# Patient Record
Sex: FEMALE | Race: WHITE | Employment: PART TIME | URBAN - METROPOLITAN AREA
[De-identification: names, ages, dates, MRNs, and addresses within clinical notes are randomized per-mention and may not be internally consistent; named-entity substitution may affect disease eponyms.]

---
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
## 2019-06-04 ENCOUNTER — OFFICE VISIT (OUTPATIENT)
Dept: FAMILY MEDICINE CLINIC | Age: 19
End: 2019-06-04
Payer: MEDICAID

## 2019-06-04 ENCOUNTER — TELEPHONE (OUTPATIENT)
Dept: FAMILY MEDICINE CLINIC | Age: 19
End: 2019-06-04

## 2019-06-04 VITALS
OXYGEN SATURATION: 99 % | HEIGHT: 71 IN | DIASTOLIC BLOOD PRESSURE: 58 MMHG | HEART RATE: 62 BPM | BODY MASS INDEX: 21.14 KG/M2 | RESPIRATION RATE: 18 BRPM | SYSTOLIC BLOOD PRESSURE: 102 MMHG | WEIGHT: 151 LBS | TEMPERATURE: 98.3 F

## 2019-06-04 DIAGNOSIS — S16.1XXA STRAIN OF NECK MUSCLE, INITIAL ENCOUNTER: Primary | ICD-10-CM

## 2019-06-04 PROCEDURE — 99213 OFFICE O/P EST LOW 20 MIN: CPT | Performed by: NURSE PRACTITIONER

## 2019-06-04 RX ORDER — IBUPROFEN 800 MG/1
800 TABLET ORAL EVERY 8 HOURS PRN
Qty: 20 TABLET | Refills: 0 | Status: SHIPPED | OUTPATIENT
Start: 2019-06-04 | End: 2019-11-27

## 2019-06-04 RX ORDER — ORPHENADRINE CITRATE 100 MG/1
100 TABLET, EXTENDED RELEASE ORAL 2 TIMES DAILY
Qty: 10 TABLET | Refills: 0 | Status: SHIPPED | OUTPATIENT
Start: 2019-06-04 | End: 2019-06-04

## 2019-06-04 RX ORDER — IBUPROFEN 800 MG/1
800 TABLET ORAL EVERY 8 HOURS PRN
Qty: 20 TABLET | Refills: 0 | Status: SHIPPED | OUTPATIENT
Start: 2019-06-04 | End: 2019-06-04

## 2019-06-04 RX ORDER — ORPHENADRINE CITRATE 100 MG/1
100 TABLET, EXTENDED RELEASE ORAL 2 TIMES DAILY
Qty: 10 TABLET | Refills: 0 | Status: SHIPPED | OUTPATIENT
Start: 2019-06-04 | End: 2019-06-09

## 2019-06-04 RX ORDER — NORETHINDRONE ACETATE AND ETHINYL ESTRADIOL 1MG-20(21)
1 KIT ORAL
COMMUNITY
Start: 2018-11-26 | End: 2019-12-25

## 2019-06-04 NOTE — LETTER
[address]  [place] [address]  Phone: [phone]  Fax: [phone] YADIAR David - CNP        June 4, 2019     Patient: Srinivas Mack   YOB: 2000   Date of Visit: 6/4/2019       To Whom It May Concern: It is my medical opinion that Srinivas Mack should be excused 6/4/19. If you have any questions or concerns, please don't hesitate to call.     Sincerely,          YADIRA Sylvester - CNP

## 2019-06-04 NOTE — PROGRESS NOTES
[address] [address]  Dept: [phone]  Dept Fax: [phone]    Rosa Isela Settler a 22 y.o. female who presents to the urgent care today for her medical conditions/complaintsas noted below. Katina Molina is c/o of Neck Pain (started yesterday and no relief from heat, ice or ibuprofen)      HPI:     Stretched yesterday and hurt rt side of neck  Hurts to move it up and to right  Taking motrin, out of it  Denies arm weakness, fever, rash, vomiting  Has tattoo behind left ear and not infected and not changed and not new      Neck Pain    This is a new problem. The current episode started yesterday. The problem occurs constantly. The problem has been waxing and waning. Associated with: was only stretching. The quality of the pain is described as aching. The pain is moderate. Exacerbated by: movement. Pertinent negatives include no fever or headaches. Associated symptoms comments: right neck pain. She has tried NSAIDs and heat for the symptoms. The treatment provided mild relief. History reviewed. No pertinent past medical history. Current Outpatient Medications   Medication Sig Dispense Refill    norethindrone-ethinyl estradiol (JUNEL FE 1/20) 1-20 MG-MCG per tablet Take 1 tablet by mouth      orphenadrine (NORFLEX) 100 MG extended release tablet Take 1 tablet by mouth 2 times daily for 5 days 10 tablet 0    ibuprofen (ADVIL;MOTRIN) 800 MG tablet Take 1 tablet by mouth every 8 hours as needed for Pain 20 tablet 0     No current facility-administered medications for this visit. No Known Allergies    Subjective:     Review of Systems   Constitutional: Negative for fever. Musculoskeletal: Positive for neck pain and neck stiffness. Skin: Negative for rash. Neurological: Negative for dizziness and headaches. All other systems reviewed and are negative.       Objective:      Physical Exam weakness, fever, headache, concern  rx norflex and motrin  Return for follow up with primary care in 2-3 days, worsening, change or concern. Orders Placed This Encounter   Medications    orphenadrine (NORFLEX) 100 MG extended release tablet     Sig: Take 1 tablet by mouth 2 times daily for 5 days     Dispense:  10 tablet     Refill:  0    ibuprofen (ADVIL;MOTRIN) 800 MG tablet     Sig: Take 1 tablet by mouth every 8 hours as needed for Pain     Dispense:  20 tablet     Refill:  0         Patient given educational materials - see patientinstructions. Discussed use, benefit, and side effects of prescribed medications. All patient questions answered. Pt voiced understanding.     Electronically signed by YADIRA Hudson 6/4/2019 at 10:59 AM

## 2019-06-04 NOTE — PATIENT INSTRUCTIONS
Motrin as directed  Tylenol as directed over the counter  Warm compresses off and on  Icy hot  Gentle stretching  Avoid alcohol, work, operating machinery or driving on muscle relaxer  Recheck with primary care this week, sooner if worse, change or concern  - arm weakness, fever, headache, concern  Patient Education        Neck Strain: Care Instructions  Your Care Instructions    You have strained the muscles and ligaments in your neck. A sudden, awkward movement can strain the neck. This often occurs with falls or car accidents or during certain sports. Everyday activities like working on a computer or sleeping can also cause neck strain if they force you to hold your neck in an awkward position for a long time. It is common for neck pain to get worse for a day or two after an injury, but it should start to feel better after that. You may have more pain and stiffness for several days before it gets better. This is expected. It may take a few weeks or longer for it to heal completely. Good home treatment can help you get better faster and avoid future neck problems. Follow-up care is a key part of your treatment and safety. Be sure to make and go to all appointments, and call your doctor if you are having problems. It's also a good idea to know your test results and keep a list of the medicines you take. How can you care for yourself at home? · If you were given a neck brace (cervical collar) to limit neck motion, wear it as instructed for as many days as your doctor tells you to. Do not wear it longer than you were told to. Wearing a brace for too long can make neck stiffness worse and weaken the neck muscles. · You can try using heat or ice to see if it helps. ? Try using a heating pad on a low or medium setting for 15 to 20 minutes every 2 to 3 hours. Try a warm shower in place of one session with the heating pad. You can also buy single-use heat wraps that last up to 8 hours.   ? You can also try an ice pack for 10 to 15 minutes every 2 to 3 hours. · Take pain medicines exactly as directed. ? If the doctor gave you a prescription medicine for pain, take it as prescribed. ? If you are not taking a prescription pain medicine, ask your doctor if you can take an over-the-counter medicine. · Gently rub the area to relieve pain and help with blood flow. Do not massage the area if it hurts to do so. · Do not do anything that makes the pain worse. Take it easy for a couple of days. You can do your usual activities if they do not hurt your neck or put it at risk for more stress or injury. · Try sleeping on a special neck pillow. Place it under your neck, not under your head. Placing a tightly rolled-up towel under your neck while you sleep will also work. If you use a neck pillow or rolled towel, do not use your regular pillow at the same time. · To prevent future neck pain, do exercises to stretch and strengthen your neck and back. Learn how to use good posture, safe lifting techniques, and proper body mechanics. When should you call for help? Call 911 anytime you think you may need emergency care. For example, call if:    · You are unable to move an arm or a leg at all.   [facility] your doctor now or seek immediate medical care if:    · You have new or worse symptoms in your arms, legs, chest, belly, or buttocks. Symptoms may include:  ? Numbness or tingling. ? Weakness. ? Pain.     · You lose bladder or bowel control.    Watch closely for changes in your health, and be sure to contact your doctor if:    · You are not getting better as expected. Where can you learn more? Go to https://bencheepeOpera SoftwareewStereotaxis.popchips. org and sign in to your Prezacor account. Enter M253 in the Pharmapod box to learn more about \"Neck Strain: Care Instructions. \"     If you do not have an account, please click on the \"Sign Up Now\" link.   Current as of: September 20, 2018  Content Version: 12.0  © 0488-1495 Healthwise, Incorporated. Care instructions adapted under license by [facility] [address] If you have questions about a medical condition or this instruction, always ask your healthcare professional. [place] [address] any warranty or liability for your use of this information. Patient Education        Neck Strain: Care Instructions  Your Care Instructions    You have strained the muscles and ligaments in your neck. A sudden, awkward movement can strain the neck. This often occurs with falls or car accidents or during certain sports. Everyday activities like working on a computer or sleeping can also cause neck strain if they force you to hold your neck in an awkward position for a long time. It is common for neck pain to get worse for a day or two after an injury, but it should start to feel better after that. You may have more pain and stiffness for several days before it gets better. This is expected. It may take a few weeks or longer for it to heal completely. Good home treatment can help you get better faster and avoid future neck problems. Follow-up care is a key part of your treatment and safety. Be sure to make and go to all appointments, and call your doctor if you are having problems. It's also a good idea to know your test results and keep a list of the medicines you take. How can you care for yourself at home? · If you were given a neck brace (cervical collar) to limit neck motion, wear it as instructed for as many days as your doctor tells you to. Do not wear it longer than you were told to. Wearing a brace for too long can make neck stiffness worse and weaken the neck muscles. · You can try using heat or ice to see if it helps. ? Try using a heating pad on a low or medium setting for 15 to 20 minutes every 2 to 3 hours. Try a warm shower in place of one session with the heating pad. You can also buy single-use heat wraps that last up to 8 hours.   ? You can also try an ice pack for 10 to 15 minutes every 2 to 3 hours. · Take pain medicines exactly as directed. ? If the doctor gave you a prescription medicine for pain, take it as prescribed. ? If you are not taking a prescription pain medicine, ask your doctor if you can take an over-the-counter medicine. · Gently rub the area to relieve pain and help with blood flow. Do not massage the area if it hurts to do so. · Do not do anything that makes the pain worse. Take it easy for a couple of days. You can do your usual activities if they do not hurt your neck or put it at risk for more stress or injury. · Try sleeping on a special neck pillow. Place it under your neck, not under your head. Placing a tightly rolled-up towel under your neck while you sleep will also work. If you use a neck pillow or rolled towel, do not use your regular pillow at the same time. · To prevent future neck pain, do exercises to stretch and strengthen your neck and back. Learn how to use good posture, safe lifting techniques, and proper body mechanics. When should you call for help? Call 911 anytime you think you may need emergency care. For example, call if:    · You are unable to move an arm or a leg at all.   [facility] your doctor now or seek immediate medical care if:    · You have new or worse symptoms in your arms, legs, chest, belly, or buttocks. Symptoms may include:  ? Numbness or tingling. ? Weakness. ? Pain.     · You lose bladder or bowel control.    Watch closely for changes in your health, and be sure to contact your doctor if:    · You are not getting better as expected. Where can you learn more? Go to https://SynthelischarleneMultiLing Corporation.Zedmo. org and sign in to your Techoz account. Enter M253 in the Zingdom Communications box to learn more about \"Neck Strain: Care Instructions. \"     If you do not have an account, please click on the \"Sign Up Now\" link. Current as of: September 20, 2018  Content Version: 12.0  © 7580-7204 Healthwise, [facility].  Care instructions adapted under license by [facility] ([facility]). If you have questions about a medical condition or this instruction, always ask your healthcare professional. [address] any warranty or liability for your use of this information. Patient Education        orphenadrine  Pronunciation:  carleen sweeney  Brand:  Norflex  What is the most important information I should know about orphenadrine? You should not take this medicine if you have urination problems, an enlarged prostate, glaucoma, a stomach ulcer or blockage in your digestive tract, trouble swallowing, or myasthenia gravis. What is orphenadrine? Orphenadrine is a muscle relaxer. Orphenadrine is used together with rest and physical therapy to treat skeletal muscle conditions such as pain or injury. Orphenadrine may also be used for purposes not listed in this medication guide. What should I discuss with my healthcare provider before taking orphenadrine? You should not use orphenadrine if you are allergic to it, or if you have:  · urination problems;  · an enlarged prostate;  · glaucoma;  · a stomach ulcer;  · a blockage in your stomach or intestines;  · trouble swallowing; or  · myasthenia gravis. To make sure orphenadrine is safe for you, tell your doctor if you have:  · heart disease;  · a heart rhythm disorder;  · coronary artery disease; or  · if you also use a narcotic (opioid) medication. It is not known whether this medicine will harm an unborn baby. Tell your doctor if you are pregnant or plan to become pregnant. It is not known whether orphenadrine passes into breast milk or if it could affect the nursing baby. Tell your doctor if you are breast-feeding. Orphenadrine is not approved for use by anyone younger than 25years old. How should I take orphenadrine? Follow all directions on your prescription label. Do not take this medicine in larger or smaller amounts or for longer than recommended.   Orphenadrine may be habit-forming. Never share orphenadrine with another person, especially someone with a history of drug abuse or addiction. Keep the medication in a place where others cannot get to it. Selling or giving away this medicine is against the law. Orphenadrine is usually taken 2 times per day, once in the morning and once in the evening. Follow your doctor's dosing instructions. Do not crush, chew, or break an extended-release tablet. Swallow it whole. Orphenadrine is only part of a complete program of treatment that may also include rest, physical therapy, or other pain relief measures. Store orphenadrine at room temperature away from moisture, heat, and light. Keep the bottle tightly closed when not in use. Keep track of the amount of medicine used from each new bottle. Orphenadrine is a drug of abuse and you should be aware if anyone is using your medicine improperly or without a prescription. What happens if I miss a dose? Take the missed dose as soon as you remember. Skip the missed dose if it is almost time for your next scheduled dose. Do not take extra medicine to make up the missed dose. What happens if I overdose? Seek emergency medical attention or call the Poison Help line at 1-442.590.8597. What should I avoid while taking orphenadrine? This medicine may impair your thinking or reactions. Be careful if you drive or do anything that requires you to be alert. Do not drink alcohol. Dangerous side effects or death can occur. What are the possible side effects of orphenadrine? Get emergency medical help if you have signs of an allergic reaction: hives; difficult breathing; swelling of your face, lips, tongue, or throat.   Stop using orphenadrine and call your doctor at once if you have:  · a light-headed feeling, like you might pass out;  · painful or difficult urination;  · little or no urination;  · confusion, anxiety, agitation, tremors, hallucinations; or  · pounding heartbeats or fluttering in your chest.  Common side effects may include:  · dizziness, drowsiness, weakness;  · nausea, vomiting;  · dry mouth; or  · constipation. This is not a complete list of side effects and others may occur. Tell your doctor about any unusual or bothersome side effect. You may report side effects to FDA at 6-841-FDA-8432. What other drugs will affect orphenadrine? Taking orphenadrine with other drugs that make you sleepy or slow your breathing can cause dangerous side effects or death. Ask your doctor before taking a sleeping pill, narcotic pain medicine, prescription cough medicine, a muscle relaxer, or medicine for anxiety, depression, or seizures. Other drugs may interact with orphenadrine, including prescription and over-the-counter medicines, vitamins, and herbal products. Tell each of your health care providers about all medicines you use now and any medicine you start or stop using. Where can I get more information? Your pharmacist can provide more information about orphenadrine. Remember, keep this and all other medicines out of the reach of children, never share your medicines with others, and use this medication only for the indication prescribed. Every effort has been made to ensure that the information provided by [address] is accurate, up-to-date, and complete, but no guarantee is made to that effect. Drug information contained herein may be time sensitive. [employer] information has been compiled for use by healthcare practitioners and consumers in the [place] and therefore [employer] does not warrant that uses outside of the [place] are appropriate, unless specifically indicated otherwise. [employer] drug information does not endorse drugs, diagnose patients or recommend therapy.  [employer] drug information is an informational resource designed to assist licensed healthcare practitioners in caring for their patients and/or to serve consumers viewing this service

## 2019-11-27 ENCOUNTER — HOSPITAL ENCOUNTER (OUTPATIENT)
Age: 19
Setting detail: SPECIMEN
Discharge: HOME | End: 2019-11-27
Payer: COMMERCIAL

## 2019-11-27 ENCOUNTER — OFFICE VISIT (OUTPATIENT)
Dept: OBGYN CLINIC | Age: 19
End: 2019-11-27
Payer: COMMERCIAL

## 2019-11-27 VITALS
SYSTOLIC BLOOD PRESSURE: 111 MMHG | DIASTOLIC BLOOD PRESSURE: 60 MMHG | HEART RATE: 83 BPM | WEIGHT: 145 LBS | HEIGHT: 61 IN | BODY MASS INDEX: 27.38 KG/M2

## 2019-11-27 DIAGNOSIS — N92.6 IRREGULAR MENSES: ICD-10-CM

## 2019-11-27 DIAGNOSIS — Z01.419 WOMEN'S ANNUAL ROUTINE GYNECOLOGICAL EXAMINATION: Primary | ICD-10-CM

## 2019-11-27 DIAGNOSIS — Z80.49 FAMILY HISTORY OF UTERINE CANCER: ICD-10-CM

## 2019-11-27 DIAGNOSIS — Z30.41 ENCOUNTER FOR SURVEILLANCE OF CONTRACEPTIVE PILLS: ICD-10-CM

## 2019-11-27 DIAGNOSIS — Z20.2 POSSIBLE EXPOSURE TO STD: ICD-10-CM

## 2019-11-27 DIAGNOSIS — Z12.4 CERVICAL CANCER SCREENING: ICD-10-CM

## 2019-11-27 DIAGNOSIS — Z30.09 GENERAL COUNSELLING AND ADVICE ON CONTRACEPTION: ICD-10-CM

## 2019-11-27 PROCEDURE — 99385 PREV VISIT NEW AGE 18-39: CPT | Performed by: ADVANCED PRACTICE MIDWIFE

## 2019-11-27 ASSESSMENT — PATIENT HEALTH QUESTIONNAIRE - PHQ9
SUM OF ALL RESPONSES TO PHQ9 QUESTIONS 1 & 2: 0
1. LITTLE INTEREST OR PLEASURE IN DOING THINGS: 0
2. FEELING DOWN, DEPRESSED OR HOPELESS: 0
SUM OF ALL RESPONSES TO PHQ QUESTIONS 1-9: 0
SUM OF ALL RESPONSES TO PHQ QUESTIONS 1-9: 0

## 2019-11-27 ASSESSMENT — ENCOUNTER SYMPTOMS
EYES NEGATIVE: 1
RESPIRATORY NEGATIVE: 1
ALLERGIC/IMMUNOLOGIC NEGATIVE: 1
GASTROINTESTINAL NEGATIVE: 1

## 2019-11-29 DIAGNOSIS — B37.31 VAGINAL YEAST INFECTION: Primary | ICD-10-CM

## 2019-11-29 LAB
CHLAMYDIA BY THIN PREP: NEGATIVE
DIRECT EXAM: ABNORMAL
Lab: ABNORMAL
N. GONORRHOEAE DNA, THIN PREP: NEGATIVE
SPECIMEN DESCRIPTION: ABNORMAL
SPECIMEN DESCRIPTION: NORMAL

## 2019-11-29 RX ORDER — FLUCONAZOLE 150 MG/1
150 TABLET ORAL ONCE
Qty: 1 TABLET | Refills: 0 | Status: SHIPPED | OUTPATIENT
Start: 2019-11-29 | End: 2019-11-29

## 2019-12-03 ENCOUNTER — TELEPHONE (OUTPATIENT)
Dept: OBGYN CLINIC | Age: 19
End: 2019-12-03

## 2019-12-03 DIAGNOSIS — B37.31 VAGINAL YEAST INFECTION: Primary | ICD-10-CM

## 2019-12-03 LAB — CYTOLOGY REPORT: NORMAL

## 2019-12-03 RX ORDER — FLUCONAZOLE 150 MG/1
150 TABLET ORAL ONCE
Qty: 1 TABLET | Refills: 0 | Status: SHIPPED | OUTPATIENT
Start: 2019-12-03 | End: 2019-12-03

## 2019-12-06 ENCOUNTER — TELEPHONE (OUTPATIENT)
Dept: OBGYN CLINIC | Age: 19
End: 2019-12-06

## 2019-12-24 ENCOUNTER — PROCEDURE VISIT (OUTPATIENT)
Dept: OBGYN CLINIC | Age: 19
End: 2019-12-24
Payer: COMMERCIAL

## 2019-12-24 DIAGNOSIS — N92.6 IRREGULAR MENSES: ICD-10-CM

## 2019-12-24 DIAGNOSIS — Z30.430 ENCOUNTER FOR IUD INSERTION: Primary | ICD-10-CM

## 2019-12-24 PROBLEM — Z97.5 IUD (INTRAUTERINE DEVICE) IN PLACE: Status: ACTIVE | Noted: 2019-12-24

## 2019-12-24 PROCEDURE — 58300 INSERT INTRAUTERINE DEVICE: CPT | Performed by: ADVANCED PRACTICE MIDWIFE

## 2019-12-25 VITALS
HEIGHT: 71 IN | WEIGHT: 150 LBS | SYSTOLIC BLOOD PRESSURE: 110 MMHG | HEART RATE: 67 BPM | DIASTOLIC BLOOD PRESSURE: 68 MMHG | BODY MASS INDEX: 21 KG/M2

## 2020-01-22 ENCOUNTER — HOSPITAL ENCOUNTER (OUTPATIENT)
Age: 20
Setting detail: SPECIMEN
Discharge: HOME | End: 2020-01-22
Payer: COMMERCIAL

## 2020-01-22 ENCOUNTER — OFFICE VISIT (OUTPATIENT)
Dept: FAMILY MEDICINE CLINIC | Age: 20
End: 2020-01-22
Payer: COMMERCIAL

## 2020-01-22 VITALS
TEMPERATURE: 99.1 F | WEIGHT: 145 LBS | RESPIRATION RATE: 16 BRPM | DIASTOLIC BLOOD PRESSURE: 66 MMHG | HEIGHT: 71 IN | HEART RATE: 72 BPM | OXYGEN SATURATION: 98 % | BODY MASS INDEX: 20.3 KG/M2 | SYSTOLIC BLOOD PRESSURE: 98 MMHG

## 2020-01-22 LAB — S PYO AG THROAT QL: NORMAL

## 2020-01-22 PROCEDURE — 99213 OFFICE O/P EST LOW 20 MIN: CPT | Performed by: NURSE PRACTITIONER

## 2020-01-22 PROCEDURE — 87880 STREP A ASSAY W/OPTIC: CPT | Performed by: NURSE PRACTITIONER

## 2020-01-22 RX ORDER — PREDNISONE 50 MG/1
50 TABLET ORAL DAILY
Qty: 2 TABLET | Refills: 0 | Status: SHIPPED | OUTPATIENT
Start: 2020-01-22 | End: 2020-01-24

## 2020-01-22 ASSESSMENT — PATIENT HEALTH QUESTIONNAIRE - PHQ9
SUM OF ALL RESPONSES TO PHQ QUESTIONS 1-9: 0
2. FEELING DOWN, DEPRESSED OR HOPELESS: 0
SUM OF ALL RESPONSES TO PHQ QUESTIONS 1-9: 0
1. LITTLE INTEREST OR PLEASURE IN DOING THINGS: 0
SUM OF ALL RESPONSES TO PHQ9 QUESTIONS 1 & 2: 0

## 2020-01-22 ASSESSMENT — ENCOUNTER SYMPTOMS
DIARRHEA: 0
RHINORRHEA: 0
VOMITING: 0
COUGH: 1
SORE THROAT: 1

## 2020-01-22 NOTE — PATIENT INSTRUCTIONS
Increase fluids  Good handwashing  Motrin and tylenol as needed as directed if able to take  Gargle warm salt water  May try flonase or claritin  We will notify you if strep culture is +tomorrow  Recheck for worsening, change or concern  Follow up with primary care in one week, sooner if worse  Patient Education        Sore Throat: Care Instructions  Your Care Instructions    Infection by bacteria or a virus causes most sore throats. Cigarette smoke, dry air, air pollution, allergies, and yelling can also cause a sore throat. Sore throats can be painful and annoying. Fortunately, most sore throats go away on their own. If you have a bacterial infection, your doctor may prescribe antibiotics. Follow-up care is a key part of your treatment and safety. Be sure to make and go to all appointments, and call your doctor if you are having problems. It's also a good idea to know your test results and keep a list of the medicines you take. How can you care for yourself at home? · If your doctor prescribed antibiotics, take them as directed. Do not stop taking them just because you feel better. You need to take the full course of antibiotics. · Gargle with warm salt water once an hour to help reduce swelling and relieve discomfort. Use 1 teaspoon of salt mixed in 1 cup of warm water. · Take an over-the-counter pain medicine, such as acetaminophen (Tylenol), ibuprofen (Advil, Motrin), or naproxen (Aleve). Read and follow all instructions on the label. · Be careful when taking over-the-counter cold or flu medicines and Tylenol at the same time. Many of these medicines have acetaminophen, which is Tylenol. Read the labels to make sure that you are not taking more than the recommended dose. Too much acetaminophen (Tylenol) can be harmful. · Drink plenty of fluids. Fluids may help soothe an irritated throat. Hot fluids, such as tea or soup, may help decrease throat pain.   · Use over-the-counter throat lozenges to soothe pain. Regular cough drops or hard candy may also help. These should not be given to young children because of the risk of choking. · Do not smoke or allow others to smoke around you. If you need help quitting, talk to your doctor about stop-smoking programs and medicines. These can increase your chances of quitting for good. · Use a vaporizer or humidifier to add moisture to your bedroom. Follow the directions for cleaning the machine. When should you call for help? Call your doctor now or seek immediate medical care if:    · You have new or worse trouble swallowing.     · Your sore throat gets much worse on one side.    Watch closely for changes in your health, and be sure to contact your doctor if you do not get better as expected. Where can you learn more? Go to https://SupplyBetter.ChipIn. org and sign in to your Samba TV account. Enter U599 in the Iptune box to learn more about \"Sore Throat: Care Instructions. \"     If you do not have an account, please click on the \"Sign Up Now\" link. Current as of: July 28, 2019  Content Version: 12.3  © 4566-7711 Healthwise, Incorporated. Care instructions adapted under license by [facility] ([facility]). If you have questions about a medical condition or this instruction, always ask your healthcare professional. [address] any warranty or liability for your use of this information.

## 2020-01-22 NOTE — PROGRESS NOTES
[address] [facility]  [address] [place] [address] [address] [address]  Dept: [phone]  Dept Fax: [phone]    Martina Harley a 23 y.o. female who presents to the urgent care today for her medical conditions/complaintsas noted below. Ivana Andres is c/o of Pharyngitis (feels like needles when swollowing x 4 days ago and has been getting worse since)      HPI:     Sore throat for 4 days  Denies runny nose, fever, vomiting, diarrhea, rash  Has minimal cough    Pharyngitis   This is a new problem. Episode onset: 4 days. The problem occurs intermittently. The problem has been waxing and waning. Associated symptoms include coughing and a sore throat. Pertinent negatives include no fever, rash or vomiting. The symptoms are aggravated by swallowing. Treatments tried: throat spray. Past Medical History:   Diagnosis Date    Irregular menses        Current Outpatient Medications   Medication Sig Dispense Refill    predniSONE (DELTASONE) 50 MG tablet Take 1 tablet by mouth daily for 2 doses 2 tablet 0     Current Facility-Administered Medications   Medication Dose Route Frequency Provider Last Rate Last Dose    levonorgestrel (MIRENA) IUD 52 mg 1 each  1 each Intrauterine Continuous YADIRA Leyva CNM   1 each at 12/24/19 1240      No Known Allergies    Subjective:     Review of Systems   Constitutional: Negative for fever. HENT: Positive for sore throat. Negative for rhinorrhea. Respiratory: Positive for cough. Gastrointestinal: Negative for diarrhea and vomiting. Skin: Negative for rash. All other systems reviewed and are negative. Objective:      Physical Exam  Vitals signs and nursing note reviewed. Constitutional:       General: She is not in acute distress. Appearance: Normal appearance. She is well-developed. She is not ill-appearing, toxic-appearing or diaphoretic. HENT:      Head: Normocephalic and atraumatic.       Right Ear: Tympanic membrane normal.      Left Ear: Tympanic membrane normal.      Nose: Nose normal.      Mouth/Throat:      Mouth: Mucous membranes are moist.      Pharynx: Posterior oropharyngeal erythema present. No oropharyngeal exudate. Comments: Tonsils are surgically absent  Eyes:      General: No scleral icterus. Right eye: No discharge. Left eye: No discharge. Pupils: Pupils are equal, round, and reactive to light. Neck:      Musculoskeletal: Normal range of motion and neck supple. No neck rigidity. Cardiovascular:      Rate and Rhythm: Normal rate and regular rhythm. Heart sounds: Normal heart sounds. No murmur. Pulmonary:      Effort: Pulmonary effort is normal. No respiratory distress. Breath sounds: Normal breath sounds. No stridor. No wheezing, rhonchi or rales. Abdominal:      General: Bowel sounds are normal.      Palpations: Abdomen is soft. Tenderness: There is no tenderness. Musculoskeletal: Normal range of motion. Right lower leg: No edema. Left lower leg: No edema. Lymphadenopathy:      Cervical: Cervical adenopathy present. Skin:     General: Skin is warm and dry. Coloration: Skin is not jaundiced or pale. Findings: No erythema or rash. Neurological:      General: No focal deficit present. Mental Status: She is alert and oriented to person, place, and time. Cranial Nerves: No cranial nerve deficit. Psychiatric:         Mood and Affect: Mood normal.         Behavior: Behavior normal.       BP 98/66   Pulse 72   Temp 99.1 °F (37.3 °C)   Resp 16   Ht 5' 11\" (1.803 m)   Wt 145 lb (65.8 kg)   LMP 12/21/2019   SpO2 98%   BMI 20.22 kg/m²   Results for POC orders placed in visit on 01/22/20   POCT rapid strep A   Result Value Ref Range    Strep A Ag None Detected None Detected       Assessment:          Diagnosis Orders   1. Acute pharyngitis, unspecified etiology  Strep A DNA probe, amplification   2.  Sore

## 2020-01-23 ENCOUNTER — OFFICE VISIT (OUTPATIENT)
Dept: OBGYN CLINIC | Age: 20
End: 2020-01-23

## 2020-01-23 LAB
DIRECT EXAM: NORMAL
Lab: NORMAL
SPECIMEN DESCRIPTION: NORMAL

## 2020-01-23 PROCEDURE — 99024 POSTOP FOLLOW-UP VISIT: CPT | Performed by: ADVANCED PRACTICE MIDWIFE

## 2020-01-23 NOTE — PROGRESS NOTES
Subjective:  Chief Complaint   Patient presents with    Follow-up     IUD String check      HPI:  Nawaf Douglass is a 23 y.o. female who arrives to office as an established patient for IUD string check. She reports she has had some irregular spotting with the IUD in place but otherwise no symptoms. No abdominal pain or cramping. Denies concerns. Declines STI screening today, denies new partners. Review of Systems   Constitutional: Negative. HENT: Negative. Eyes: Negative. Respiratory: Negative. Cardiovascular: Negative. Gastrointestinal: Negative. Endocrine: Negative. Genitourinary: Positive for vaginal bleeding (irregular since IUD placement). Negative for dyspareunia, flank pain, frequency, genital sores, menstrual problem, pelvic pain and vaginal pain. Musculoskeletal: Negative. Skin: Negative. Allergic/Immunologic: Negative. Neurological: Negative. Hematological: Negative. Psychiatric/Behavioral: Negative. Objective:  Vitals:    01/23/20 0802   BP: 114/73   Site: Left Upper Arm   Position: Sitting   Cuff Size: Medium Adult   Pulse: 100   Weight: 145 lb 6 oz (65.9 kg)   Height: 5' 11\" (1.803 m)      Body mass index is 20.28 kg/m². Patient's last menstrual period was 12/21/2019. No current outpatient medications on file prior to visit. Current Facility-Administered Medications on File Prior to Visit   Medication Dose Route Frequency Provider Last Rate Last Dose    levonorgestrel (MIRENA) IUD 52 mg 1 each  1 each Intrauterine Continuous Lindy Cassette, APRN - CNM   1 each at 12/24/19 1240      Past Medical History:   Diagnosis Date    Irregular menses        Last PAP was normal; November/2019. Physical Exam  Vitals signs reviewed. Constitutional:       Appearance: Normal appearance. She is normal weight. HENT:      Head: Normocephalic and atraumatic. Eyes:      Extraocular Movements: Extraocular movements intact.    Neck:      Musculoskeletal: Normal range of motion. Cardiovascular:      Rate and Rhythm: Normal rate and regular rhythm. Heart sounds: Normal heart sounds. No murmur. Pulmonary:      Effort: Pulmonary effort is normal. No respiratory distress. Breath sounds: Normal breath sounds. Abdominal:      General: Abdomen is flat. Bowel sounds are normal. There is no distension. Palpations: Abdomen is soft. Tenderness: There is no abdominal tenderness. Genitourinary:     General: Normal vulva. Exam position: Supine. Labia:         Right: No lesion. Left: No lesion. Vagina: No vaginal discharge. Cervix: Cervical bleeding (scant) present. No friability. Uterus: Normal.       Adnexa: Right adnexa normal and left adnexa normal.      Comments: IUD strings visualized from cervical os  Musculoskeletal: Normal range of motion. Lymphadenopathy:      Lower Body: No right inguinal adenopathy. No left inguinal adenopathy. Skin:     General: Skin is warm and dry. Capillary Refill: Capillary refill takes less than 2 seconds. Neurological:      Mental Status: She is alert and oriented to person, place, and time. Mental status is at baseline. Psychiatric:         Mood and Affect: Mood normal.         Behavior: Behavior normal.         Thought Content: Thought content normal.         Judgment: Judgment normal.        Assessment/Plan:    IUD check up  · Reviewed normalcy of spotting after IUD insertion and that it may persist even daily for 3-4 months. Spotting may persist longer but oftentimes decreases over time, some patients may eventually have no menses or spotting. · Reviewed condom usage for STI prevention  · GC/CT collected on 11/27/19  was negative  · Reviewed s/s of infection  · Reviewed s/s of expulsion including increased cramping, lengthening of IUD strings, or visualizing device.  Patient verbalized understanding  · Continue monthly string checks    Return in about 10 months (around 11/23/2020) for Annual exam.    Problem list reviewed and updated as indicated. Upon completion of the visit all questions were answered. History was reviewed as documented on Epic Navigator. Patient was seen with total face to face time of 15 minutes. More than 50% of this visit was spent face to face coordinating plan of care and answering questions regarding encounter diagnoses and all of the above. She was also counseled on her preventative health maintenance recommendations and follow-up.

## 2020-01-26 VITALS
SYSTOLIC BLOOD PRESSURE: 114 MMHG | WEIGHT: 145.38 LBS | DIASTOLIC BLOOD PRESSURE: 73 MMHG | HEART RATE: 100 BPM | HEIGHT: 71 IN | BODY MASS INDEX: 20.35 KG/M2

## 2020-01-26 ASSESSMENT — ENCOUNTER SYMPTOMS
RESPIRATORY NEGATIVE: 1
GASTROINTESTINAL NEGATIVE: 1
EYES NEGATIVE: 1
ALLERGIC/IMMUNOLOGIC NEGATIVE: 1

## 2020-02-04 ENCOUNTER — HOSPITAL ENCOUNTER (OUTPATIENT)
Age: 20
Setting detail: SPECIMEN
Discharge: HOME | End: 2020-02-04
Payer: COMMERCIAL

## 2020-02-04 ENCOUNTER — OFFICE VISIT (OUTPATIENT)
Dept: OBGYN CLINIC | Age: 20
End: 2020-02-04
Payer: COMMERCIAL

## 2020-02-04 VITALS
DIASTOLIC BLOOD PRESSURE: 71 MMHG | WEIGHT: 145 LBS | SYSTOLIC BLOOD PRESSURE: 108 MMHG | HEART RATE: 72 BPM | HEIGHT: 61 IN | BODY MASS INDEX: 27.38 KG/M2

## 2020-02-04 PROCEDURE — 99213 OFFICE O/P EST LOW 20 MIN: CPT | Performed by: ADVANCED PRACTICE MIDWIFE

## 2020-02-04 ASSESSMENT — ENCOUNTER SYMPTOMS
NAUSEA: 0
DIARRHEA: 0
VOMITING: 0
SHORTNESS OF BREATH: 0
ABDOMINAL PAIN: 0

## 2020-02-04 NOTE — PROGRESS NOTES
[place] [address] [address] [facility] [address]  Dept: [phone]    Patient Name: Elsa Amin  Patient Age: 23 y.o. Date of Visit: 2020    Subjective  Chief Complaint   Patient presents with    Vaginal Itching     start day about a week ago      Patient's last menstrual period was 2019. Arrives with concerns for possible yeast infection x1 week. Reports vaginal irritation/itching x1 week. Reports is sexually active with one male partner, declines STI testing. Reports having IUD placed in December        OB History        0    Para        Term                AB        Living           SAB        TAB        Ectopic        Molar        Multiple        Live Births                  Past Medical History:   Diagnosis Date    Irregular menses      No current outpatient medications on file. Current Facility-Administered Medications   Medication Dose Route Frequency Provider Last Rate Last Dose    levonorgestrel (MIRENA) IUD 52 mg 1 each  1 each Intrauterine Continuous Kailey Slice, APRN - CNM   1 each at 19 1240     Review of Systems   Constitutional: Negative for unexpected weight change. Respiratory: Negative for shortness of breath. Cardiovascular: Negative for chest pain, palpitations and leg swelling. Gastrointestinal: Negative for abdominal pain, diarrhea, nausea and vomiting. Genitourinary: Positive for vaginal discharge (and irritation). Negative for difficulty urinating, dyspareunia and vaginal pain. Neurological: Negative for dizziness, light-headedness and headaches. Objective  /71 (Site: Left Upper Arm, Position: Sitting, Cuff Size: Medium Adult)   Pulse 72   Ht 5' 1.32\" (1.558 m)   Wt 145 lb (65.8 kg)   LMP 2019 Comment: Pt has IUD insertion 19  BMI 27.11 kg/m²     Physical Exam  Vitals signs reviewed. Constitutional:       General: She is not in acute distress. Appearance: She is well-developed. She is not diaphoretic. Neck:      Musculoskeletal: Normal range of motion. Thyroid: No thyromegaly. Cardiovascular:      Rate and Rhythm: Normal rate and regular rhythm. Pulmonary:      Effort: Pulmonary effort is normal. No respiratory distress. Breath sounds: Normal breath sounds. Genitourinary:     Labia:         Right: No rash, tenderness, lesion or injury. Left: No rash, tenderness, lesion or injury. Vagina: Vaginal discharge (brown discharge) present. Cervix: No cervical motion tenderness or friability. Comments: IUD strings noted from cervical os  Musculoskeletal: Normal range of motion. Skin:     General: Skin is warm and dry. Neurological:      Mental Status: She is alert and oriented to person, place, and time. Psychiatric:         Behavior: Behavior normal.         Thought Content: Thought content normal.         Judgment: Judgment normal.           Assessment & Plan  1. Vaginal irritation  - Vaginitis DNA Probe; Future  - Reviewed and reinforced safe sex practices  - Offer STI testing, patient declined     2. IUD (intrauterine device) in place        Patient was seen with total face to face time of 15 minutes. Morethan 50% of this visit was on counseling and education regarding her diagnoses and her options. She was also counseled on her preventative health maintenance recommendations and follow-up. Return if symptoms worsen or fail to improve.     Electronically Signed Rebbecca Olszewski, APRN - CNM

## 2020-02-05 ENCOUNTER — TELEPHONE (OUTPATIENT)
Dept: OBGYN CLINIC | Age: 20
End: 2020-02-05

## 2020-02-05 LAB
DIRECT EXAM: ABNORMAL
Lab: ABNORMAL
SPECIMEN DESCRIPTION: ABNORMAL

## 2020-02-05 RX ORDER — METRONIDAZOLE 500 MG/1
500 TABLET ORAL 2 TIMES DAILY
Qty: 14 TABLET | Refills: 0 | Status: SHIPPED | OUTPATIENT
Start: 2020-02-05 | End: 2020-02-12

## 2020-02-05 RX ORDER — FLUCONAZOLE 150 MG/1
150 TABLET ORAL ONCE
Qty: 1 TABLET | Refills: 0 | Status: SHIPPED | OUTPATIENT
Start: 2020-02-05 | End: 2020-02-05

## 2020-02-05 NOTE — TELEPHONE ENCOUNTER
Pt informed of her result but pharmacy is not able to fill medication [place] [facility] [address] [facility]

## 2020-02-06 ENCOUNTER — TELEPHONE (OUTPATIENT)
Dept: OBGYN CLINIC | Age: 20
End: 2020-02-06

## 2020-04-22 ENCOUNTER — TELEPHONE (OUTPATIENT)
Dept: OBGYN CLINIC | Age: 20
End: 2020-04-22

## 2020-04-23 ENCOUNTER — TELEMEDICINE (OUTPATIENT)
Dept: OBGYN CLINIC | Age: 20
End: 2020-04-23
Payer: COMMERCIAL

## 2020-04-23 VITALS — BODY MASS INDEX: 21 KG/M2 | WEIGHT: 150 LBS | HEIGHT: 71 IN

## 2020-04-23 PROCEDURE — 99213 OFFICE O/P EST LOW 20 MIN: CPT | Performed by: ADVANCED PRACTICE MIDWIFE

## 2020-04-23 RX ORDER — FLUCONAZOLE 150 MG/1
150 TABLET ORAL ONCE
Qty: 1 TABLET | Refills: 0 | Status: SHIPPED | OUTPATIENT
Start: 2020-04-23 | End: 2020-04-23

## 2020-04-23 RX ORDER — METRONIDAZOLE 500 MG/1
500 TABLET ORAL 2 TIMES DAILY
Qty: 14 TABLET | Refills: 0 | Status: SHIPPED | OUTPATIENT
Start: 2020-04-23 | End: 2020-04-30

## 2020-04-23 ASSESSMENT — ENCOUNTER SYMPTOMS
VOMITING: 0
ABDOMINAL PAIN: 0
CHEST TIGHTNESS: 0
NAUSEA: 0
SHORTNESS OF BREATH: 0

## 2020-04-23 NOTE — PROGRESS NOTES
2020    TELEHEALTH EVALUATION -- Audio/Visual (During [facility]- public health emergency)    HPI:    Edgar Asencio (:  2000) has requested an audio/video evaluation for the following concern(s):    Arrives for virtual visit to discuss vaginal concerns. Reports for two weeks having an increased in discharge reports it as white and thick. Reports for the last week having an odor with her discharge. Reports no vaginal irritation/itching/burning. Reports no lumps/masses/lesions noted vaginally. Reports no dysuria or difficulty urinating. Reports has not had sex for 2 weeks. Reports is with the same partner. Reports no new medications and no allergies to medications. Reports having an IUD and checking her strings monthly, reports is able to feel her strings. Review of Systems   Constitutional: Negative for fatigue, fever and unexpected weight change. Respiratory: Negative for chest tightness and shortness of breath. Cardiovascular: Negative for chest pain. Gastrointestinal: Negative for abdominal pain, nausea and vomiting. Genitourinary: Positive for vaginal discharge (with odor). Negative for difficulty urinating, dysuria, frequency, genital sores and menstrual problem. Prior to Visit Medications    Medication Sig Taking?  Authorizing Provider   fluconazole (DIFLUCAN) 150 MG tablet Take 1 tablet by mouth once for 1 dose Yes YADIRA Castro CNM   metroNIDAZOLE (FLAGYL) 500 MG tablet Take 1 tablet by mouth 2 times daily for 7 days Yes YADIRA Castro CNM       Social History     Tobacco Use    Smoking status: Never Smoker    Smokeless tobacco: Never Used   Substance Use Topics    Alcohol use: Yes     Comment: rarely    Drug use: No      PHYSICAL EXAMINATION:  [ INSTRUCTIONS:  \"[x]\" Indicates a positive item  \"[]\" Indicates a negative item  -- DELETE ALL ITEMS NOT EXAMINED]  Vital Signs: (As obtained by patient/caregiver or practitioner observation)    Patient reports does not have access to blood pressure machine    Constitutional: [x] Appears well-developed and well-nourished [x] No apparent distress      [] Abnormal-   Mental status  [x] Alert and awake  [x] Oriented to person/place/time [x]Able to follow commands        HENT:   [x] Normocephalic, atraumatic. [] Abnormal   [] Mouth/Throat: Mucous membranes are moist.       Neck: [x] No visualized mass     Pulmonary/Chest: [x] Respiratory effort normal.  [x] No visualized signs of difficulty breathing or respiratory distress        [] Abnormal-      Musculoskeletal:   [x] Normal gait with no signs of ataxia         [] Normal range of motion of neck        [] Abnormal-       Neurological:        [x] No Facial Asymmetry (Cranial nerve 7 motor function) (limited exam to video visit)          [] No gaze palsy        [] Abnormal-         Skin:        [x] No significant exanthematous lesions or discoloration noted on facial skin         [] Abnormal-            Psychiatric:       [x] Normal Affect [x] No Hallucinations        [] Abnormal-         ASSESSMENT/PLAN:  1. Acute vaginitis  - Reviewed vaginal hygiene. Plan to treat with oral medications. Will follow up if symptoms do not resolve. Patient declined STD testing. Patient is up to date on pap smear.  - fluconazole (DIFLUCAN) 150 MG tablet; Take 1 tablet by mouth once for 1 dose  Dispense: 1 tablet; Refill: 0  - metroNIDAZOLE (FLAGYL) 500 MG tablet; Take 1 tablet by mouth 2 times daily for 7 days  Dispense: 14 tablet; Refill: 0      Return if symptoms worsen or fail to improve. Ibis Simmons is a 23 y.o. female being evaluated by a Virtual Visit (video visit) encounter to address concerns as mentioned above. A caregiver was present when appropriate. Due to this being a TeleHealth encounter (During [facility] public health emergency), evaluation of the following organ systems was limited: Vitals/Constitutional/EENT/Resp/CV/GI//MS/Neuro/Skin/Heme-Lymph-Imm.   Pursuant to the emergency declaration under the [facility], [address] [place] [address] [facility] authority and the [employer] Act, this Virtual Visit was conducted with patient's (and/or legal guardian's) consent, to reduce the patient's risk of exposure to COVID-19 and provide necessary medical care. The patient (and/or legal guardian) has also been advised to contact this office for worsening conditions or problems, and seek emergency medical treatment and/or call 911 if deemed necessary. Total time spent on this encounter: 15    Services were provided through a video synchronous discussion virtually to substitute for in-person clinic visit. Patient and provider were located at their individual homes. --YADIRA De La O CNM on 4/23/2020 at 1:00 PM    An electronic signature was used to authenticate this note.

## 2020-07-28 ENCOUNTER — HOSPITAL ENCOUNTER (OUTPATIENT)
Age: 20
Setting detail: SPECIMEN
Discharge: HOME | End: 2020-07-28
Payer: COMMERCIAL

## 2020-07-28 ENCOUNTER — OFFICE VISIT (OUTPATIENT)
Dept: OBGYN CLINIC | Age: 20
End: 2020-07-28
Payer: COMMERCIAL

## 2020-07-28 VITALS
HEART RATE: 83 BPM | BODY MASS INDEX: 28.32 KG/M2 | WEIGHT: 150 LBS | HEIGHT: 61 IN | SYSTOLIC BLOOD PRESSURE: 113 MMHG | DIASTOLIC BLOOD PRESSURE: 72 MMHG

## 2020-07-28 PROCEDURE — 99213 OFFICE O/P EST LOW 20 MIN: CPT | Performed by: ADVANCED PRACTICE MIDWIFE

## 2020-07-28 ASSESSMENT — ENCOUNTER SYMPTOMS
DIARRHEA: 0
NAUSEA: 0
VOMITING: 0
ABDOMINAL PAIN: 0
SHORTNESS OF BREATH: 0

## 2020-07-28 NOTE — PROGRESS NOTES
[place] [address] [address] [facility] [address]  Dept: [phone]    Patient Name: Alayna Thomas  Patient Age: 23 y.o. Date of Visit: 2020    Subjective  Chief Complaint   Patient presents with    Vaginal Discharge     and odor      No LMP recorded. Patient has had an implant. Patient to office with concern for migration of IUD due to cramping that began two and a half weeks ago. Patient reports with discharge with odor for one a half week. Patient reports she is sexually active with one male partner, she has been tested for STIs with this partner and would also like to be tested today. OB History        0    Para        Term                AB        Living           SAB        TAB        Ectopic        Molar        Multiple        Live Births                  Past Medical History:   Diagnosis Date    Irregular menses      No current outpatient medications on file. Current Facility-Administered Medications   Medication Dose Route Frequency Provider Last Rate Last Dose    levonorgestrel (MIRENA) IUD 52 mg 1 each  1 each Intrauterine Continuous YADIRA Prieto - MIKAYLAM   1 each at 19 1240     Review of Systems   Constitutional: Negative for unexpected weight change. Respiratory: Negative for shortness of breath. Cardiovascular: Negative for chest pain, palpitations and leg swelling. Gastrointestinal: Negative for abdominal pain, diarrhea, nausea and vomiting. Genitourinary: Positive for vaginal discharge (with cramping). Negative for difficulty urinating, dyspareunia, menstrual problem and vaginal pain. Neurological: Negative for dizziness, light-headedness and headaches. Objective  /72 (Site: Left Upper Arm, Position: Sitting, Cuff Size: Medium Adult)   Pulse 83   Ht 5' 1.32\" (1.558 m)   Wt 150 lb (68 kg)   BMI 28.05 kg/m²     Physical Exam  Vitals signs reviewed.    Constitutional: General: She is not in acute distress. Appearance: She is well-developed. She is not diaphoretic. Neck:      Musculoskeletal: Normal range of motion. Thyroid: No thyromegaly. Cardiovascular:      Rate and Rhythm: Normal rate and regular rhythm. Pulmonary:      Effort: Pulmonary effort is normal. No respiratory distress. Breath sounds: Normal breath sounds. Genitourinary:     Vagina: Vaginal discharge (white thick) present. Cervix: No cervical motion tenderness or friability. Comments: IUD strings noted from os  Musculoskeletal: Normal range of motion. Skin:     General: Skin is warm and dry. Neurological:      Mental Status: She is alert and oriented to person, place, and time. Psychiatric:         Behavior: Behavior normal.         Thought Content: Thought content normal.         Judgment: Judgment normal.           Assessment & Plan  1. Vaginal discharge  - Reviewed cramping can be normal with IUD or associated with yeast/bv. Reviewed this is a new symptoms with her IUD. Prefers oral treatment. Reviewed if cramping persists can consider ultrasound to review placement, IUD strings noted without difficulty on exam today. Reviewed checking strings months which she reports she does  - VAGINITIS DNA PROBE  - C.trachomatis N.gonorrhoeae DNA; Future  - Pap up to date    2. IUD (intrauterine device) in place        Patient was seen with total face to face time of 15 minutes. Morethan 50% of this visit was on counseling and education regarding her diagnoses and her options. She was also counseled on her preventative health maintenance recommendations and follow-up. No follow-ups on file.     Electronically Signed YADIRA Garcia CNM

## 2020-07-29 LAB
DIRECT EXAM: ABNORMAL
Lab: ABNORMAL
SPECIMEN DESCRIPTION: ABNORMAL

## 2020-07-29 RX ORDER — METRONIDAZOLE 500 MG/1
500 TABLET ORAL 2 TIMES DAILY
Qty: 14 TABLET | Refills: 0 | Status: SHIPPED | OUTPATIENT
Start: 2020-07-29 | End: 2020-08-05

## 2020-07-30 ENCOUNTER — TELEPHONE (OUTPATIENT)
Dept: OBGYN CLINIC | Age: 20
End: 2020-07-30

## 2020-07-30 LAB
C TRACH DNA GENITAL QL NAA+PROBE: ABNORMAL
N. GONORRHOEAE DNA: NEGATIVE
SPECIMEN DESCRIPTION: ABNORMAL

## 2020-07-30 RX ORDER — AZITHROMYCIN 500 MG/1
1000 TABLET, FILM COATED ORAL ONCE
Qty: 2 TABLET | Refills: 0 | Status: SHIPPED | OUTPATIENT
Start: 2020-07-30 | End: 2020-07-30

## 2020-07-30 NOTE — TELEPHONE ENCOUNTER
Reviewed positive chlamydia results. Reviewed treatment sent to pharmacy. Expedited  partner therapy sent also [facility] 3/28/95. Reviewed no sex for seven days after treatment.  Reviewed recommendation for HIV/t.pal orders placed into system

## 2020-09-03 ENCOUNTER — PATIENT MESSAGE (OUTPATIENT)
Dept: OBGYN CLINIC | Age: 20
End: 2020-09-03

## 2020-09-03 NOTE — TELEPHONE ENCOUNTER
From: Tammie Gonzalez  To: YADIRA ChristianM  Sent: 9/3/2020 11:52 AM EDT  Subject: Non-Urgent Medical Question    Yes if we could please! And also, I might have a yeast infection or BV again and I'm not sure why I'm getting them so often. Alea David

## 2020-09-13 NOTE — PROGRESS NOTES
Subjective:  Chief Complaint   Patient presents with    Vaginal Discharge     vaginal pain and odor      HPI:  Milton Lopez is a 23 y.o. female who arrives to office as an established patient with concern her IUD has migrated because she is having pelvic pain. States pelvic pain started a week ago (sharp), states it comes and goes, she thinks it is from [address]. States she feels that she gets BV after every time she bleeds. Bleeding is about monthly with her IUD in place. Mirena placed IUD 12/21/19. Does feel she has been having pain with sex lately, particularly with deep thrusting. Reports vaginal discharge x 2 weeks, reports odor, denies irritation. Is interested in suppression therapy if she is positive today. Is having vaginal spotting that started 2 days ago. She reports she is sexually active with 1 male partner, and they use condoms 0% of the time. Radha Heath requested STI screening. Treated for CT 7/30/20. Review of Systems   Constitutional: Negative. HENT: Negative. Eyes: Negative. Respiratory: Negative. Cardiovascular: Negative. Gastrointestinal: Negative. Negative for abdominal pain. Endocrine: Negative. Genitourinary: Positive for dyspareunia, dysuria (mild), pelvic pain and vaginal discharge. Negative for flank pain, genital sores, menstrual problem and urgency. Musculoskeletal: Negative. Skin: Negative. Allergic/Immunologic: Negative. Neurological: Negative. Hematological: Negative. Psychiatric/Behavioral: Negative. Objective:  Vitals:    09/14/20 0959   BP: 109/73   Site: Right Upper Arm   Position: Sitting   Cuff Size: Medium Adult   Pulse: 71   Weight: 146 lb (66.2 kg)   Height: 5' 1.32\" (1.558 m)      Body mass index is 27.3 kg/m². No LMP recorded. Patient has had an implant. No current outpatient medications on file prior to visit.      Current Facility-Administered Medications on File Prior to Visit   Medication Dose Route Frequency Provider Last Rate Last Dose    levonorgestrel (MIRENA) IUD 52 mg 1 each  1 each Intrauterine Continuous Elisabet Fresh, APRN - CNM   1 each at 12/24/19 1240      Past Medical History:   Diagnosis Date    Irregular menses      Last PAP was normal; November/2019. Due 2022    Physical Exam  Vitals signs reviewed. Constitutional:       Appearance: Normal appearance. She is normal weight. HENT:      Head: Normocephalic and atraumatic. Eyes:      Extraocular Movements: Extraocular movements intact. Neck:      Musculoskeletal: Normal range of motion. Cardiovascular:      Rate and Rhythm: Normal rate and regular rhythm. Heart sounds: Normal heart sounds. No murmur. Pulmonary:      Effort: Pulmonary effort is normal. No respiratory distress. Breath sounds: Normal breath sounds. Abdominal:      General: Abdomen is flat. Bowel sounds are normal. There is no distension. Palpations: Abdomen is soft. Tenderness: There is no abdominal tenderness. Genitourinary:     General: Normal vulva. Exam position: Supine. Labia:         Right: No tenderness or lesion. Left: No tenderness or lesion. Vagina: Vaginal discharge (small, white) present. No tenderness. Cervix: No cervical motion tenderness, friability or lesion. Uterus: Normal.       Adnexa: Right adnexa normal and left adnexa normal.      Comments: IUD strings not visible at os, but end of strings palpable on bimanual exam slightly into cervix. Base of IUD not palpable on exam. Vaginal blood in vault, scant. Musculoskeletal: Normal range of motion. Lymphadenopathy:      Lower Body: No right inguinal adenopathy. No left inguinal adenopathy. Skin:     General: Skin is warm and dry. Capillary Refill: Capillary refill takes less than 2 seconds. Neurological:      Mental Status: She is alert and oriented to person, place, and time. Mental status is at baseline.    Psychiatric:         Mood and Affect: Mood normal. Behavior: Behavior normal.         Thought Content: Thought content normal.         Judgment: Judgment normal.          Assessment/Plan:    IUD check up  · US NON OB TRANSVAGINAL; Future  · US PELVIS COMPLETE; Future  · Reviewed device is likely in place as IUD strings palpated coming from cervix, U/S ordered for reassurance. Has not had any heavy bleeding or passing clots. Reviewed if ultrasound is normal with IUD in place, pelvic pain may be caused by BV, or may be from hormone changes/no identifiable cause. Reviewed no sex till ultrasound results reviewed. · Reviewed if still having discomfort after infection and malposition is ruled out then it will be up to her if she desires to keep IUD in place. · Reviewed IUD string checks may be difficult but she should feel them after U/S confirms placement so she knows what her \"norm\" is. Vaginal discharge  · VAGINITIS DNA PROBE  · Chlamydia Trachomatis & Neisseria gonorrhoeae (GC) by amplified detection; Future  · Reviewed importance of condom usage with every sexual encounter    · Reviewed will treat for BV if the swab is positive, then we can initiate suppression therapy if she desires, see below. Recurrent vaginitis  · Reviewed BV triggers and vaginal hygiene. Altagracia instructed to avoid products with fragrance, use cotton-only underwear, change out of wet clothes immediately, use cotton-only liners and pads when needed (no nylon), and monitor for irritants. · If she is positive for BV today, can initiate suppression therapy after initial treatment. If positive, plan to treat infection with Flagyl 500 mg BID x 7 days, then the following week start Metrogel vaginally biweekly x 4-6 months. Dysuria  · Culture, Urine; Future  · Chlamydia Trachomatis & Neisseria gonorrhoeae (GC) by amplified detection;  Future  · Low suspicion for UTI based on reported symptoms      Return in about 2 months (around 11/14/2020) for Annual exam.    Problem list reviewed and updated as indicated. Upon completion of the visit all questions were answered. History was reviewed as documented on Epic Navigator. Patient was seen with total face to face time of 20 minutes. More than 50% of this visit was spent face to face coordinating plan of care and answering questions regarding encounter diagnoses and all of the above. She was also counseled on her preventative health maintenance recommendations and follow-up.

## 2020-09-14 ENCOUNTER — OFFICE VISIT (OUTPATIENT)
Dept: OBGYN CLINIC | Age: 20
End: 2020-09-14
Payer: COMMERCIAL

## 2020-09-14 ENCOUNTER — HOSPITAL ENCOUNTER (OUTPATIENT)
Age: 20
Setting detail: SPECIMEN
Discharge: HOME | End: 2020-09-14
Payer: COMMERCIAL

## 2020-09-14 VITALS
BODY MASS INDEX: 27.56 KG/M2 | SYSTOLIC BLOOD PRESSURE: 109 MMHG | HEART RATE: 71 BPM | WEIGHT: 146 LBS | DIASTOLIC BLOOD PRESSURE: 73 MMHG | HEIGHT: 61 IN

## 2020-09-14 PROCEDURE — 99213 OFFICE O/P EST LOW 20 MIN: CPT | Performed by: ADVANCED PRACTICE MIDWIFE

## 2020-09-14 ASSESSMENT — ENCOUNTER SYMPTOMS
GASTROINTESTINAL NEGATIVE: 1
ABDOMINAL PAIN: 0
RESPIRATORY NEGATIVE: 1
EYES NEGATIVE: 1
ALLERGIC/IMMUNOLOGIC NEGATIVE: 1

## 2020-09-15 LAB
DIRECT EXAM: ABNORMAL
Lab: ABNORMAL
SPECIMEN DESCRIPTION: ABNORMAL

## 2020-09-15 RX ORDER — METRONIDAZOLE 7.5 MG/G
1 GEL VAGINAL
Qty: 1 TUBE | Refills: 1 | Status: SHIPPED | OUTPATIENT
Start: 2020-09-17 | End: 2020-10-26

## 2020-09-15 RX ORDER — METRONIDAZOLE 500 MG/1
500 TABLET ORAL 2 TIMES DAILY
Qty: 14 TABLET | Refills: 0 | Status: SHIPPED | OUTPATIENT
Start: 2020-09-15 | End: 2020-09-22

## 2020-09-16 LAB
C TRACH DNA GENITAL QL NAA+PROBE: NEGATIVE
CULTURE: ABNORMAL
Lab: ABNORMAL
N. GONORRHOEAE DNA: NEGATIVE
SPECIMEN DESCRIPTION: ABNORMAL
SPECIMEN DESCRIPTION: NORMAL

## 2020-09-17 RX ORDER — NITROFURANTOIN 25; 75 MG/1; MG/1
100 CAPSULE ORAL 2 TIMES DAILY
Qty: 10 CAPSULE | Refills: 0 | Status: SHIPPED | OUTPATIENT
Start: 2020-09-17 | End: 2020-09-22

## 2020-09-18 ENCOUNTER — HOSPITAL ENCOUNTER (OUTPATIENT)
Dept: ULTRASOUND IMAGING | Facility: CLINIC | Age: 20
Discharge: HOME | End: 2020-09-20
Payer: COMMERCIAL

## 2020-09-18 PROCEDURE — 76856 US EXAM PELVIC COMPLETE: CPT

## 2020-09-18 PROCEDURE — 76830 TRANSVAGINAL US NON-OB: CPT

## 2020-09-28 ENCOUNTER — PATIENT MESSAGE (OUTPATIENT)
Dept: OBGYN CLINIC | Age: 20
End: 2020-09-28

## 2020-09-28 NOTE — TELEPHONE ENCOUNTER
From: Gema Handley  To: YADIRA Narayanan - CNM  Sent: 9/28/2020 1:13 PM EDT  Subject: Test Results Question    I have a question about US Pelvis Complete resulted on 9/18/20, 9:34 AM.    Is there anything I should be worried about? Any cysts at all?

## 2020-10-14 ENCOUNTER — HOSPITAL ENCOUNTER (OUTPATIENT)
Age: 20
Setting detail: SPECIMEN
Discharge: HOME | End: 2020-10-14
Payer: COMMERCIAL

## 2020-10-15 LAB
CULTURE: NORMAL
DIRECT EXAM: NORMAL
Lab: NORMAL
Lab: NORMAL
SPECIMEN DESCRIPTION: NORMAL
SPECIMEN DESCRIPTION: NORMAL

## 2020-10-26 ENCOUNTER — PROCEDURE VISIT (OUTPATIENT)
Dept: OBGYN CLINIC | Age: 20
End: 2020-10-26
Payer: COMMERCIAL

## 2020-10-26 VITALS
HEIGHT: 60 IN | SYSTOLIC BLOOD PRESSURE: 119 MMHG | WEIGHT: 146 LBS | HEART RATE: 86 BPM | DIASTOLIC BLOOD PRESSURE: 73 MMHG | BODY MASS INDEX: 28.66 KG/M2

## 2020-10-26 PROCEDURE — 58301 REMOVE INTRAUTERINE DEVICE: CPT | Performed by: ADVANCED PRACTICE MIDWIFE

## 2020-10-26 RX ORDER — NORETHINDRONE ACETATE AND ETHINYL ESTRADIOL 1MG-20(21)
1 KIT ORAL DAILY
Qty: 3 PACKET | Refills: 0 | Status: SHIPPED | OUTPATIENT
Start: 2020-10-26 | End: 2021-01-04 | Stop reason: SDUPTHER

## 2020-10-26 ASSESSMENT — ENCOUNTER SYMPTOMS
EYES NEGATIVE: 1
GASTROINTESTINAL NEGATIVE: 1
RESPIRATORY NEGATIVE: 1
ALLERGIC/IMMUNOLOGIC NEGATIVE: 1

## 2020-10-26 NOTE — PROGRESS NOTES
Subjective:  Chief Complaint   Patient presents with    Procedure     IUD removal     HPI:  David Anaya is a 23 y.o. female who arrives to office as an established patient for IUD removal. She has ongoing abdominal cramping and irregular bleeding despite known correct IUD placement and infection ruled out. She had the Mirena IUD placed 12/24/19. She would like to start BCP instead for her history of irregular menses. She denies vaginal discharge, odor, irritation, and itching. She denies urinary or bowel complaints. She reports there is a personal history or family history of:    Smoking (> 15 cigs/day): No    Migraine with Aura:  No    HTN (> 160/100): No    DVT:  No    Thrombophilias:  No    Stroke (CVA): No     Ischemic heart disease:  No    Valvular heart disease (A Fib, Pul HTN, etc): No    Positive Antiphospholipid Abs:  No    Liver Disease:  No     Review of Systems   Constitutional: Negative. HENT: Negative. Eyes: Negative. Respiratory: Negative. Cardiovascular: Negative. Gastrointestinal: Negative. Endocrine: Negative. Genitourinary: Positive for menstrual problem (hx irregular menses) and pelvic pain (cramping). Negative for dyspareunia, dysuria, flank pain, frequency, genital sores, vaginal bleeding and vaginal discharge. Musculoskeletal: Negative. Skin: Negative. Allergic/Immunologic: Negative. Neurological: Negative. Hematological: Negative. Psychiatric/Behavioral: Negative. Objective:  Vitals:    10/26/20 1021   BP: 119/73   Site: Right Upper Arm   Position: Sitting   Cuff Size: Medium Adult   Pulse: 86   Weight: 146 lb (66.2 kg)   Height: 5' (1.524 m)      Body mass index is 28.51 kg/m². No LMP recorded. Patient has had an implant. No current outpatient medications on file prior to visit. No current facility-administered medications on file prior to visit.        Past Medical History:   Diagnosis Date    Irregular menses      Last PAP was normal; November/2019. Procedure: Mirena IUD removal  The patient was counseled on the procedure. Risks, benefits and alternatives were reviewed. Consent for removal was obtained. The patient was positioned comfortably on the exam table. After a bi-manual exam; the uterus was found to be  anteverted. There was no cervical motion tenderness or adnexal masses. The bladder was smooth, non-tender and without palpable masses. A sterile speculum was placed without incident and the string was identified at the cervical portion. The strings were grasped with a ring forcep and the IUD was removed without incident. Assessment/Plan:    Encounter for IUD removal  · Post procedure restrictions were reviewed and given to the patient. · She was instructed to use barrier protection   · Reviewed warning signs    Encounter for initial prescription of contraceptive pills  Discussed BCP correct use and side effects. Reviewed ACHES  She was instructed to use barrier protection for STI prevention at all times. · norethindrone-ethinyl estradiol (JUNEL FE 1/20) 1-20 MG-MCG per tablet; Take 1 tablet by mouth daily    Irregular menses  · Hx BCP use, reports desires to re-start since she wanted IUD removed  · norethindrone-ethinyl estradiol (JUNEL FE 1/20) 1-20 MG-MCG per tablet; Take 1 tablet by mouth daily        Return in about 3 months (around 1/26/2021) for BCP follow up (virtual OK). Problem list reviewed and updated as indicated. Upon completion of the visit all questions were answered. History was reviewed as documented on Epic Navigator. Patient was seen with total face to face time of 30 minutes. More than 50% of this visit was spent face to face coordinating plan of care and answering questions regarding encounter diagnoses and all of the above. She was also counseled on her preventative health maintenance recommendations and follow-up.

## 2020-11-04 ENCOUNTER — PATIENT MESSAGE (OUTPATIENT)
Dept: OBGYN CLINIC | Age: 20
End: 2020-11-04

## 2020-11-04 RX ORDER — FLUCONAZOLE 150 MG/1
150 TABLET ORAL ONCE
Qty: 1 TABLET | Refills: 0 | Status: SHIPPED | OUTPATIENT
Start: 2020-11-04 | End: 2020-11-04

## 2020-11-04 NOTE — TELEPHONE ENCOUNTER
From: Shobha Pagan  To: Tanika Camacho, APRN - CNM  Sent: 11/4/2020 2:33 PM EST  Subject: Non-Urgent Medical Question    I think I may have a yeast infection. . I'm so over all of this.  Is there anyway a pill can be sent to my pharmacy or do I have to come in?

## 2020-11-13 ENCOUNTER — PATIENT MESSAGE (OUTPATIENT)
Dept: OBGYN CLINIC | Age: 20
End: 2020-11-13

## 2020-11-16 NOTE — TELEPHONE ENCOUNTER
From: Amalia Fong  To: YADIRA Bunch - MIKAYLAM  Sent: 11/13/2020 8:51 PM EST  Subject: Non-Urgent Medical Question    I've been feeling a lot better since the removal, but I thought I had a yeast infection and was treated for it but I still have an itching feeling when I use the bathroom not sure why

## 2020-11-25 RX ORDER — FLUCONAZOLE 150 MG/1
150 TABLET ORAL ONCE
Qty: 1 TABLET | Refills: 1 | Status: SHIPPED | OUTPATIENT
Start: 2020-11-25 | End: 2020-11-25

## 2021-01-04 ENCOUNTER — HOSPITAL ENCOUNTER (OUTPATIENT)
Age: 21
Setting detail: SPECIMEN
Discharge: HOME | End: 2021-01-04
Payer: COMMERCIAL

## 2021-01-04 ENCOUNTER — OFFICE VISIT (OUTPATIENT)
Dept: OBGYN CLINIC | Age: 21
End: 2021-01-04
Payer: COMMERCIAL

## 2021-01-04 VITALS
BODY MASS INDEX: 28.32 KG/M2 | HEART RATE: 73 BPM | DIASTOLIC BLOOD PRESSURE: 72 MMHG | SYSTOLIC BLOOD PRESSURE: 120 MMHG | HEIGHT: 61 IN | WEIGHT: 150 LBS

## 2021-01-04 DIAGNOSIS — N89.8 VAGINAL DISCHARGE: ICD-10-CM

## 2021-01-04 DIAGNOSIS — Z30.41 ENCOUNTER FOR SURVEILLANCE OF CONTRACEPTIVE PILLS: Primary | ICD-10-CM

## 2021-01-04 DIAGNOSIS — N92.6 IRREGULAR MENSES: ICD-10-CM

## 2021-01-04 DIAGNOSIS — R30.0 DYSURIA: ICD-10-CM

## 2021-01-04 PROBLEM — Z97.5 IUD (INTRAUTERINE DEVICE) IN PLACE: Status: RESOLVED | Noted: 2019-12-24 | Resolved: 2021-01-04

## 2021-01-04 LAB
BILIRUBIN, POC: NORMAL
BLOOD URINE, POC: NORMAL
CLARITY, POC: CLEAR
COLOR, POC: YELLOW
CONTROL: PRESENT
GLUCOSE URINE, POC: NORMAL
KETONES, POC: NORMAL
LEUKOCYTE EST, POC: NORMAL
NITRITE, POC: NORMAL
PH, POC: 8
PREGNANCY TEST URINE, POC: NEGATIVE
PROTEIN, POC: NORMAL
SPECIFIC GRAVITY, POC: 1
UROBILINOGEN, POC: NORMAL

## 2021-01-04 PROCEDURE — 81003 URINALYSIS AUTO W/O SCOPE: CPT | Performed by: ADVANCED PRACTICE MIDWIFE

## 2021-01-04 PROCEDURE — 81025 URINE PREGNANCY TEST: CPT | Performed by: ADVANCED PRACTICE MIDWIFE

## 2021-01-04 PROCEDURE — 99213 OFFICE O/P EST LOW 20 MIN: CPT | Performed by: ADVANCED PRACTICE MIDWIFE

## 2021-01-04 RX ORDER — NORETHINDRONE ACETATE AND ETHINYL ESTRADIOL 1MG-20(21)
1 KIT ORAL DAILY
Qty: 3 PACKET | Refills: 3 | Status: SHIPPED | OUTPATIENT
Start: 2021-01-04 | End: 2021-08-31 | Stop reason: SDUPTHER

## 2021-01-04 ASSESSMENT — PATIENT HEALTH QUESTIONNAIRE - PHQ9
SUM OF ALL RESPONSES TO PHQ QUESTIONS 1-9: 0
1. LITTLE INTEREST OR PLEASURE IN DOING THINGS: 0
SUM OF ALL RESPONSES TO PHQ9 QUESTIONS 1 & 2: 0
SUM OF ALL RESPONSES TO PHQ QUESTIONS 1-9: 0
SUM OF ALL RESPONSES TO PHQ QUESTIONS 1-9: 0

## 2021-01-04 ASSESSMENT — ENCOUNTER SYMPTOMS
RESPIRATORY NEGATIVE: 1
GASTROINTESTINAL NEGATIVE: 1
ALLERGIC/IMMUNOLOGIC NEGATIVE: 1
EYES NEGATIVE: 1

## 2021-01-04 NOTE — PROGRESS NOTES
Subjective:  Chief Complaint   Patient presents with    Follow-up     3 mthx OCP follow up      HPI:  Allan Major is a 23 y.o. female who arrives to office as an established patient for birth control pill refill. She had her Mirena IUD removed 10/26/20 and started on Junel Fe birth control pills. She had periods the first two months then did not have one last month, requests UPT. She has no s/s pregnancy. Is doing well on BCP otherwise, has been taking it daily as prescribed. No BTB, some breast tenderness. She has some discharge and mild intermittent pelvic cramping. Mild dysuria. Happens every few days, started within the past week or so. Review of Systems   Constitutional: Negative. HENT: Negative. Eyes: Negative. Respiratory: Negative. Cardiovascular: Negative. Gastrointestinal: Negative. Endocrine: Negative. Genitourinary: Positive for dysuria (mild, every few days) and vaginal discharge. Negative for dyspareunia, flank pain, frequency, menstrual problem (taking OCPs, missed last period), pelvic pain and urgency. Musculoskeletal: Negative. Skin: Negative. Allergic/Immunologic: Negative. Neurological: Negative. Hematological: Negative. Psychiatric/Behavioral: Negative. Objective:  Vitals:    01/04/21 1121   BP: 120/72   Site: Right Upper Arm   Position: Sitting   Cuff Size: Medium Adult   Pulse: 73   Weight: 150 lb (68 kg)   Height: 5' 1.32\" (1.558 m)      Body mass index is 28.05 kg/m². No LMP recorded. No current outpatient medications on file prior to visit. No current facility-administered medications on file prior to visit. Past Medical History:   Diagnosis Date    Irregular menses      Last PAP was normal; November/2019. Physical Exam  Vitals signs reviewed. Constitutional:       Appearance: Normal appearance. She is normal weight. HENT:      Head: Normocephalic and atraumatic.    Neck:      Musculoskeletal: Normal range of motion. Cardiovascular:      Rate and Rhythm: Normal rate and regular rhythm. Heart sounds: Normal heart sounds. No murmur. Pulmonary:      Effort: Pulmonary effort is normal. No respiratory distress. Breath sounds: Normal breath sounds. Abdominal:      General: Abdomen is flat. There is no distension. Palpations: Abdomen is soft. Tenderness: There is no abdominal tenderness. Genitourinary:     General: Normal vulva. Exam position: Supine. Labia:         Right: No tenderness or lesion. Left: No tenderness or lesion. Vagina: Vaginal discharge (scant ,white) present. Cervix: No friability or lesion. Musculoskeletal: Normal range of motion. Lymphadenopathy:      Lower Body: No right inguinal adenopathy. No left inguinal adenopathy. Skin:     General: Skin is warm and dry. Neurological:      Mental Status: She is alert and oriented to person, place, and time. Mental status is at baseline. Psychiatric:         Mood and Affect: Mood normal.         Behavior: Behavior normal.         Thought Content: Thought content normal.         Judgment: Judgment normal.        POCT urine preg: negative  POCT Urinalysis: negative     Assessment/Plan:    Encounter for surveillance of contraceptive pills  · norethindrone-ethinyl estradiol (JUNEL FE 1/20) 1-20 MG-MCG per tablet; Take 1 tablet by mouth daily  Discussed BCP correct use and side effects. She was made aware that hormone-based contraception may increase her risk of developing a blood clot which may in turn increase both her mortality and morbidity risks. The life-threatening side effects discussed included SOB, chest pains, severe HA's and/or calf pain (ACHES). Sidney Or was advised that if these occur, she is to stop her contraception, notify our office and present to the ER for evaluation. She was instructed to use barrier protection for STI prevention at all times.     Irregular

## 2021-01-05 DIAGNOSIS — N89.8 VAGINAL DISCHARGE: ICD-10-CM

## 2021-01-05 LAB
DIRECT EXAM: NORMAL
Lab: NORMAL
SPECIMEN DESCRIPTION: NORMAL

## 2021-01-06 LAB
C TRACH DNA GENITAL QL NAA+PROBE: NEGATIVE
N. GONORRHOEAE DNA: NEGATIVE
SPECIMEN DESCRIPTION: NORMAL

## 2021-01-25 ENCOUNTER — PATIENT MESSAGE (OUTPATIENT)
Dept: OBGYN CLINIC | Age: 21
End: 2021-01-25

## 2021-01-25 DIAGNOSIS — R30.0 DYSURIA: Primary | ICD-10-CM

## 2021-01-29 ENCOUNTER — HOSPITAL ENCOUNTER (EMERGENCY)
Age: 21
Discharge: HOME | End: 2021-01-30
Attending: EMERGENCY MEDICINE
Payer: COMMERCIAL

## 2021-01-29 VITALS
HEART RATE: 83 BPM | BODY MASS INDEX: 28.35 KG/M2 | TEMPERATURE: 98.2 F | SYSTOLIC BLOOD PRESSURE: 107 MMHG | DIASTOLIC BLOOD PRESSURE: 55 MMHG | RESPIRATION RATE: 16 BRPM | OXYGEN SATURATION: 100 % | WEIGHT: 151.6 LBS

## 2021-01-29 DIAGNOSIS — N30.01 ACUTE CYSTITIS WITH HEMATURIA: Primary | ICD-10-CM

## 2021-01-29 LAB
CHP ED QC CHECK: NORMAL
PREGNANCY TEST URINE, POC: NEGATIVE

## 2021-01-29 PROCEDURE — 81025 URINE PREGNANCY TEST: CPT

## 2021-01-29 PROCEDURE — 87086 URINE CULTURE/COLONY COUNT: CPT

## 2021-01-29 PROCEDURE — 81001 URINALYSIS AUTO W/SCOPE: CPT

## 2021-01-29 ASSESSMENT — PAIN SCALES - GENERAL: PAINLEVEL_OUTOF10: 10

## 2021-01-30 LAB
-: ABNORMAL
AMORPHOUS: ABNORMAL
BACTERIA: ABNORMAL
BILIRUBIN URINE: NEGATIVE
CASTS UA: ABNORMAL /LPF
COLOR: YELLOW
COMMENT UA: ABNORMAL
CRYSTALS, UA: ABNORMAL /HPF
EPITHELIAL CELLS UA: ABNORMAL /HPF (ref 0–5)
GLUCOSE URINE: NEGATIVE
KETONES, URINE: NEGATIVE
LEUKOCYTE ESTERASE, URINE: ABNORMAL
MUCUS: ABNORMAL
NITRITE, URINE: NEGATIVE
OTHER OBSERVATIONS UA: ABNORMAL
PH UA: 6 (ref 5–8)
PROTEIN UA: NEGATIVE
RBC UA: ABNORMAL /HPF (ref 0–2)
RENAL EPITHELIAL, UA: ABNORMAL /HPF
SPECIFIC GRAVITY UA: 1.01 (ref 1–1.03)
TRICHOMONAS: ABNORMAL
TURBIDITY: ABNORMAL
URINE HGB: ABNORMAL
UROBILINOGEN, URINE: NORMAL
WBC UA: ABNORMAL /HPF (ref 0–5)
YEAST: ABNORMAL

## 2021-01-30 PROCEDURE — 99283 EMERGENCY DEPT VISIT LOW MDM: CPT

## 2021-01-30 PROCEDURE — 6370000000 HC RX 637 (ALT 250 FOR IP): Performed by: EMERGENCY MEDICINE

## 2021-01-30 RX ORDER — CIPROFLOXACIN 500 MG/1
500 TABLET, FILM COATED ORAL ONCE
Status: COMPLETED | OUTPATIENT
Start: 2021-01-30 | End: 2021-01-30

## 2021-01-30 RX ORDER — NITROFURANTOIN 25; 75 MG/1; MG/1
100 CAPSULE ORAL ONCE
Status: DISCONTINUED | OUTPATIENT
Start: 2021-01-30 | End: 2021-01-30

## 2021-01-30 RX ORDER — CIPROFLOXACIN 500 MG/1
500 TABLET, FILM COATED ORAL 2 TIMES DAILY
Qty: 10 TABLET | Refills: 0 | Status: SHIPPED | OUTPATIENT
Start: 2021-01-30 | End: 2021-02-04

## 2021-01-30 RX ADMIN — CIPROFLOXACIN 500 MG: 500 TABLET, FILM COATED ORAL at 01:04

## 2021-01-30 ASSESSMENT — ENCOUNTER SYMPTOMS
CONSTIPATION: 0
DIARRHEA: 0
SHORTNESS OF BREATH: 0
COLOR CHANGE: 0
APNEA: 0
SINUS PAIN: 0
ABDOMINAL DISTENTION: 0
EYES NEGATIVE: 1
VOMITING: 0
CHEST TIGHTNESS: 0

## 2021-01-30 NOTE — ED PROVIDER NOTES
EMERGENCY DEPARTMENT ENCOUNTER    Pt Name: Sidney Baird  MRN: [id number]  [place] 2000  Date of evaluation: 1/30/21  CHIEF COMPLAINT       Chief Complaint   Patient presents with    Dysuria    Vaginal Itching     HISTORY OF PRESENT ILLNESS   24-year-old female presents the emergency room for dysuria. Patient reports feeling pain with urination over the last 2 to 3 days. She reports that she tried taking Macrobid as well as Pyridium at home. Symptoms have not improved. She states that she does have some hematuria. She denies any vomiting or nausea. No flank pain or fevers. REVIEW OF SYSTEMS     Review of Systems   Constitutional: Negative for activity change, chills and diaphoresis. HENT: Negative for congestion, sinus pain and tinnitus. Eyes: Negative. Respiratory: Negative for apnea, chest tightness and shortness of breath. Gastrointestinal: Negative for abdominal distention, constipation, diarrhea and vomiting. Genitourinary: Positive for dysuria, hematuria and urgency. Negative for difficulty urinating and frequency. Musculoskeletal: Negative for arthralgias and myalgias. Skin: Negative for color change and rash. Neurological: Negative for dizziness. Hematological: Negative. Psychiatric/Behavioral: Negative. PASTMEDICAL HISTORY     Past Medical History:   Diagnosis Date    Irregular menses      Past Problem List  Patient Active Problem List   Diagnosis Code    Irregular menses N92.6    Family history of uterine cancer Z80.49     SURGICAL HISTORY       Past Surgical History:   Procedure Laterality Date    INTRAUTERINE DEVICE INSERTION  12/24/2019    Mirena.  Lot [id number]  Exp 03/2020    INTRAUTERINE DEVICE REMOVAL  10/26/2020    TONSILLECTOMY AND ADENOIDECTOMY  age 1     CURRENT MEDICATIONS       Previous Medications    NITROFURANTOIN, MACROCRYSTAL-MONOHYDRATE, (MACROBID) 100 MG CAPSULE    Take 1 capsule by mouth 2 times daily for 7 days NORETHINDRONE-ETHINYL ESTRADIOL (JUNEL FE 1/20) 1-20 MG-MCG PER TABLET    Take 1 tablet by mouth daily    PHENAZOPYRIDINE (PYRIDIUM) 100 MG TABLET    Take 1 tablet by mouth 3 times daily as needed for Pain     ALLERGIES     has No Known Allergies. FAMILY HISTORY     She indicated that her mother is alive. She indicated that her father is alive. She indicated that the status of her maternal grandmother is unknown. SOCIAL HISTORY       Social History     Tobacco Use    Smoking status: Never Smoker    Smokeless tobacco: Never Used   Substance Use Topics    Alcohol use: Yes     Comment: rarely    Drug use: No     PHYSICAL EXAM     INITIAL VITALS: BP (!) 107/55   Pulse 83   Temp 98.2 °F (36.8 °C)   Resp 16   Wt 151 lb 9.6 oz (68.8 kg)   SpO2 100%   BMI 28.35 kg/m²    Physical Exam  Constitutional:       General: She is not in acute distress. Appearance: She is well-developed. HENT:      Head: Normocephalic. Eyes:      Pupils: Pupils are equal, round, and reactive to light. Cardiovascular:      Rate and Rhythm: Normal rate and regular rhythm. Heart sounds: Normal heart sounds. Pulmonary:      Effort: Pulmonary effort is normal. No respiratory distress. Breath sounds: Normal breath sounds. Abdominal:      General: Bowel sounds are normal.      Palpations: Abdomen is soft. Tenderness: There is no abdominal tenderness. Musculoskeletal: Normal range of motion. Skin:     General: Skin is warm and dry. Neurological:      Mental Status: She is alert and oriented to person, place, and time. MEDICAL DECISION MAKIN-year-old female presenting to the emergency room with hematuria urgency and dysuria. No fever nausea vomiting or flank pain. Urinalysis and symptoms consistent with acute urinary tract infection. Patient discharged with ciprofloxacin prescription.     CRITICAL CARE:       PROCEDURES:    Procedures    DIAGNOSTIC RESULTS   EKG:All EKG's are interpreted by the Emergency Department Physician who either signs or Co-signs this chart in the absence of a cardiologist.        RADIOLOGY:All plain film, CT, MRI, and formal ultrasound images (except ED bedside ultrasound) are read by the radiologist, see reports below, unless otherwisenoted in MDM or here. No orders to display     LABS: All lab results were reviewed by myself, and all abnormals are listed below. Labs Reviewed   URINE RT REFLEX TO CULTURE - Abnormal; Notable for the following components:       Result Value    Turbidity UA CLOUDY (*)     Urine Hgb 3+ (*)     Leukocyte Esterase, Urine MOD (*)     All other components within normal limits   MICROSCOPIC URINALYSIS - Abnormal; Notable for the following components:    Bacteria, UA MODERATE (*)     All other components within normal limits   POCT URINE PREGNANCY - Normal   CULTURE, URINE       EMERGENCY DEPARTMENTCOURSE:         Vitals:    Vitals:    01/29/21 2305 01/29/21 2307   BP:  (!) 107/55   Pulse:  83   Resp:  16   Temp:  98.2 °F (36.8 °C)   SpO2:  100%   Weight: 151 lb 9.6 oz (68.8 kg)        The patient was given the following medications while in the emergency department:  Orders Placed This Encounter   Medications    DISCONTD: nitrofurantoin (macrocrystal-monohydrate) (MACROBID) capsule 100 mg    ciprofloxacin (CIPRO) tablet 500 mg     Order Specific Question:   Antimicrobial Indications     Answer:   Urinary Tract Infection    ciprofloxacin (CIPRO) 500 MG tablet     Sig: Take 1 tablet by mouth 2 times daily for 5 days     Dispense:  10 tablet     Refill:  0     CONSULTS:  None    FINAL IMPRESSION      1.  Acute cystitis with hematuria          DISPOSITION/PLAN   DISPOSITION Decision To Discharge 01/30/2021 12:51:56 AM      PATIENT REFERRED TO:  Renata Castillo MD  [address] [place] [address] Drive [address] [place] [address]  [phone]    Schedule an appointment as soon as possible for a visit   If symptoms worsen    DISCHARGE MEDICATIONS:  New Prescriptions    CIPROFLOXACIN (CIPRO) 500 MG TABLET    Take 1 tablet by mouth 2 times daily for 5 days     Shannan Klein MD  Attending Emergency Physician                 Wu Smallwood MD  01/30/21 6870

## 2021-01-31 LAB
CULTURE: NORMAL
Lab: NORMAL
SPECIMEN DESCRIPTION: NORMAL

## 2021-02-01 LAB — HCG, PREGNANCY URINE (POC): NEGATIVE

## 2021-04-21 ENCOUNTER — OFFICE VISIT (OUTPATIENT)
Dept: OBGYN CLINIC | Age: 21
End: 2021-04-21
Payer: COMMERCIAL

## 2021-04-21 ENCOUNTER — HOSPITAL ENCOUNTER (OUTPATIENT)
Age: 21
Setting detail: SPECIMEN
Discharge: HOME | End: 2021-04-21
Payer: COMMERCIAL

## 2021-04-21 VITALS
HEIGHT: 71 IN | SYSTOLIC BLOOD PRESSURE: 109 MMHG | WEIGHT: 153 LBS | HEART RATE: 97 BPM | BODY MASS INDEX: 21.42 KG/M2 | DIASTOLIC BLOOD PRESSURE: 75 MMHG

## 2021-04-21 DIAGNOSIS — R42 EPISODIC LIGHTHEADEDNESS: ICD-10-CM

## 2021-04-21 DIAGNOSIS — N89.8 VAGINAL DISCHARGE: ICD-10-CM

## 2021-04-21 DIAGNOSIS — R10.2 SUPRAPUBIC CRAMPING: Primary | ICD-10-CM

## 2021-04-21 LAB
ABSOLUTE EOS #: 0.36 K/UL (ref 0–0.44)
ABSOLUTE IMMATURE GRANULOCYTE: 0.03 K/UL (ref 0–0.3)
ABSOLUTE LYMPH #: 1.95 K/UL (ref 1.1–3.7)
ABSOLUTE MONO #: 0.71 K/UL (ref 0.1–1.2)
BASOPHILS # BLD: 1 % (ref 0–2)
BASOPHILS ABSOLUTE: 0.07 K/UL (ref 0–0.2)
BILIRUBIN, POC: NORMAL
BLOOD URINE, POC: PRESENT
CLARITY, POC: CLEAR
COLOR, POC: YELLOW
CONTROL: PRESENT
DIFFERENTIAL TYPE: ABNORMAL
EOSINOPHILS RELATIVE PERCENT: 4 % (ref 1–4)
GLUCOSE URINE, POC: NORMAL
HCT VFR BLD CALC: 40.5 % (ref 36.3–47.1)
HEMOGLOBIN: 12.5 G/DL (ref 11.9–15.1)
IMMATURE GRANULOCYTES: 0 %
KETONES, POC: NORMAL
LEUKOCYTE EST, POC: NORMAL
LYMPHOCYTES # BLD: 21 % (ref 24–43)
MCH RBC QN AUTO: 25.9 PG (ref 25.2–33.5)
MCHC RBC AUTO-ENTMCNC: 30.9 G/DL (ref 28.4–34.8)
MCV RBC AUTO: 84 FL (ref 82.6–102.9)
MONOCYTES # BLD: 8 % (ref 3–12)
NITRITE, POC: NORMAL
NRBC AUTOMATED: 0 PER 100 WBC
PDW BLD-RTO: 13.5 % (ref 11.8–14.4)
PH, POC: 8
PLATELET # BLD: 301 K/UL (ref 138–453)
PLATELET ESTIMATE: ABNORMAL
PMV BLD AUTO: 11.5 FL (ref 8.1–13.5)
PREGNANCY TEST URINE, POC: NEGATIVE
PROTEIN, POC: POSITIVE
RBC # BLD: 4.82 M/UL (ref 3.95–5.11)
RBC # BLD: ABNORMAL 10*6/UL
SEG NEUTROPHILS: 66 % (ref 36–65)
SEGMENTED NEUTROPHILS ABSOLUTE COUNT: 6.08 K/UL (ref 1.5–8.1)
SPECIFIC GRAVITY, POC: NORMAL
THYROXINE, FREE: 1.3 NG/DL (ref 0.93–1.7)
TSH SERPL DL<=0.05 MIU/L-ACNC: 0.45 MIU/L (ref 0.3–5)
UROBILINOGEN, POC: NORMAL
WBC # BLD: 9.2 K/UL (ref 3.5–11.3)
WBC # BLD: ABNORMAL 10*3/UL

## 2021-04-21 PROCEDURE — 81003 URINALYSIS AUTO W/O SCOPE: CPT | Performed by: ADVANCED PRACTICE MIDWIFE

## 2021-04-21 PROCEDURE — 81025 URINE PREGNANCY TEST: CPT | Performed by: ADVANCED PRACTICE MIDWIFE

## 2021-04-21 PROCEDURE — 99213 OFFICE O/P EST LOW 20 MIN: CPT | Performed by: ADVANCED PRACTICE MIDWIFE

## 2021-04-21 ASSESSMENT — ENCOUNTER SYMPTOMS
EYES NEGATIVE: 1
VOMITING: 0
ABDOMINAL PAIN: 0
ALLERGIC/IMMUNOLOGIC NEGATIVE: 1
RESPIRATORY NEGATIVE: 1
NAUSEA: 1
CONSTIPATION: 0
ABDOMINAL DISTENTION: 0
DIARRHEA: 0

## 2021-04-21 NOTE — PROGRESS NOTES
Subjective:  Chief Complaint   Patient presents with    Menstrual Problem     painful cramps      HPI:  Adolfo Clark is a 19 y.o. female who arrives to office as an established patient to discuss cramps and feeling intermittently lightheaded. Matthew Eldridge reports for the past two weeks she has had some nausea in the evenings, is eating well, no vomiting. For the past 4-5 days she has had mild intermittent craps suprapubic. Reports her period cramps are usually pelvic and in her back, but this is only suprapubic. She is taking OCPs as prescribed, denies missing doses recently. She has also had some light pink spotting yesterday which is abnormal for her. Patient's last menstrual period was 03/24/2021 (exact date). Will be due within a few days. Took a negative pregnancy test at home. She is sexually active with 1 male partner, they do not use condoms. She reports vaginal discharge but denies odor, irritation, and itching. She denies urinary or bowel complaints. Reports she is under more stress lately, working and going to school. Not sleeping great, takes her until 1-2 am to fall asleep. Review of Systems   Constitutional: Negative. Negative for chills, fever and unexpected weight change. HENT: Negative. Eyes: Negative. Respiratory: Negative. Cardiovascular: Negative. Gastrointestinal: Positive for nausea (intermittent, mostly at night before bed). Negative for abdominal distention, abdominal pain, constipation, diarrhea and vomiting. Endocrine: Negative. Genitourinary: Positive for menstrual problem (light pink spotting yesterday, taking OCPs daily), pelvic pain (cramping, suprapubic, mild, intermittent x 4-5 days) and vaginal discharge (white). Negative for dyspareunia, dysuria, flank pain, frequency and urgency. Musculoskeletal: Negative. Skin: Negative. Allergic/Immunologic: Negative. Neurological: Negative. Hematological: Negative. Psychiatric/Behavioral: Negative. Objective:  Vitals:    04/21/21 1352   BP: 109/75   Pulse: 97   Weight: 153 lb (69.4 kg)   Height: 5' 11\" (1.803 m)      Body mass index is 21.34 kg/m². Patient's last menstrual period was 03/24/2021 (exact date). Current Outpatient Medications on File Prior to Visit   Medication Sig Dispense Refill    phenazopyridine (PYRIDIUM) 100 MG tablet Take 1 tablet by mouth 3 times daily as needed for Pain 20 tablet 0    norethindrone-ethinyl estradiol (JUNEL FE 1/20) 1-20 MG-MCG per tablet Take 1 tablet by mouth daily 3 packet 3     No current facility-administered medications on file prior to visit. Past Medical History:   Diagnosis Date    Irregular menses      Last PAP was normal; November/2019. Physical Exam  Vitals signs reviewed. Constitutional:       General: She is not in acute distress. Appearance: Normal appearance. She is normal weight. She is not ill-appearing, toxic-appearing or diaphoretic. Cardiovascular:      Rate and Rhythm: Normal rate and regular rhythm. Heart sounds: Normal heart sounds. No murmur. Pulmonary:      Effort: Pulmonary effort is normal. No respiratory distress. Breath sounds: Normal breath sounds. Abdominal:      General: Abdomen is flat. Palpations: Abdomen is soft. Tenderness: There is no abdominal tenderness. Genitourinary:     General: Normal vulva. Exam position: Supine. Labia:         Right: No tenderness or lesion. Left: No tenderness or lesion. Vagina: No vaginal discharge, tenderness or lesions. Cervix: No cervical motion tenderness, friability or lesion. Uterus: Normal.       Adnexa: Right adnexa normal and left adnexa normal.      Comments: Small amount of vaginal bleeding, coming from above cervix  Musculoskeletal: Normal range of motion. Lymphadenopathy:      Lower Body: No right inguinal adenopathy. No left inguinal adenopathy. Skin:     General: Skin is warm and dry.       Capillary Refill: Capillary refill takes less than 2 seconds. Neurological:      Mental Status: She is alert and oriented to person, place, and time. Mental status is at baseline. Psychiatric:         Mood and Affect: Mood normal.         Behavior: Behavior normal.         Thought Content: Thought content normal.         Judgment: Judgment normal.        Chaperone for Intimate Exam   Chaperone was offered as part of the rooming process. Patient declined and agrees to continue with exam without a chaperone. POCT Urine preg: negative  POCT Urine dip: negative   Assessment/Plan:    Suprapubic cramping  · POCT urine pregnancy: negative   · HCG, Quantitative, Pregnancy  · VAGINITIS DNA PROBE; Future  · Chlamydia Trachomatis & Neisseria gonorrhoeae (GC) by amplified detection; Future  · POCT Urinalysis No Micro (Auto)- negative   · Will follow up with results. Reviewed if all testing is negative may just be her starting her menses earlier than expected. Denies missing any doses of her OCPs. · Reviewed s/s to report     Vaginal discharge  · VAGINITIS DNA PROBE; Future  · Chlamydia Trachomatis & Neisseria gonorrhoeae (GC) by amplified detection; Future  · Reviewed importance of condom usage with every sexual encounter      Episodic lightheadedness  · POCT urine pregnancy: negative   · HCG, Quantitative, Pregnancy  · TSH without Reflex; Future  · T4, Free; Future  · CBC Auto Differential; Future  · No syncope, head injury, or reported chest pain, shortness of breath, or headaches. Reviewed water intake and recommended increasing. She has mild nausea particularly in the evenings (is tolerating PO intake well). She is very busy with work and school and may be stress related. Reviewed if all findings are negative and her symptoms persist or worsen she should see her PCP. She verbalizes understanding. Reviewed warning signs to report. Return if symptoms worsen or fail to improve.     Problem list reviewed and updated as indicated. Upon completion of the visit all questions were answered. History was reviewed as documented on Epic Navigator. The patient, Kvng Tam,  was seen with a total time spent of 25 minutes for the visit on this date of service by the [facility]  The time component involved both face-to-face (counseling and education) and non face-to-face time (care coordination), spent in determining the total time component.

## 2021-04-22 ENCOUNTER — TELEPHONE (OUTPATIENT)
Dept: OBGYN CLINIC | Age: 21
End: 2021-04-22

## 2021-04-22 DIAGNOSIS — A56.8 CHLAMYDIA TRACHOMATIS INFECTION: Primary | ICD-10-CM

## 2021-04-22 DIAGNOSIS — B37.9 ANTIBIOTIC-INDUCED YEAST INFECTION: ICD-10-CM

## 2021-04-22 DIAGNOSIS — T36.95XA ANTIBIOTIC-INDUCED YEAST INFECTION: ICD-10-CM

## 2021-04-22 DIAGNOSIS — B96.89 BACTERIAL VAGINOSIS: ICD-10-CM

## 2021-04-22 DIAGNOSIS — N76.0 BACTERIAL VAGINOSIS: ICD-10-CM

## 2021-04-22 LAB
C TRACH DNA GENITAL QL NAA+PROBE: ABNORMAL
CULTURE: NORMAL
DIRECT EXAM: ABNORMAL
Lab: ABNORMAL
Lab: NORMAL
N. GONORRHOEAE DNA: NEGATIVE
SPECIMEN DESCRIPTION: ABNORMAL
SPECIMEN DESCRIPTION: ABNORMAL
SPECIMEN DESCRIPTION: NORMAL

## 2021-04-22 RX ORDER — METRONIDAZOLE 500 MG/1
500 TABLET ORAL 2 TIMES DAILY
Qty: 14 TABLET | Refills: 0 | Status: SHIPPED | OUTPATIENT
Start: 2021-04-22 | End: 2021-04-29

## 2021-04-22 RX ORDER — AZITHROMYCIN 500 MG/1
1000 TABLET, FILM COATED ORAL ONCE
Qty: 2 TABLET | Refills: 0 | Status: SHIPPED | OUTPATIENT
Start: 2021-04-22 | End: 2021-04-22

## 2021-04-22 RX ORDER — FLUCONAZOLE 150 MG/1
150 TABLET ORAL ONCE
Qty: 1 TABLET | Refills: 0 | Status: SHIPPED | OUTPATIENT
Start: 2021-04-22 | End: 2021-04-22

## 2021-06-07 ENCOUNTER — OFFICE VISIT (OUTPATIENT)
Dept: OBGYN CLINIC | Age: 21
End: 2021-06-07
Payer: COMMERCIAL

## 2021-06-07 ENCOUNTER — HOSPITAL ENCOUNTER (OUTPATIENT)
Age: 21
Setting detail: SPECIMEN
Discharge: HOME | End: 2021-06-07
Payer: COMMERCIAL

## 2021-06-07 VITALS
SYSTOLIC BLOOD PRESSURE: 113 MMHG | HEIGHT: 61 IN | WEIGHT: 153 LBS | DIASTOLIC BLOOD PRESSURE: 78 MMHG | BODY MASS INDEX: 28.89 KG/M2

## 2021-06-07 DIAGNOSIS — N92.6 MISSED MENSES: ICD-10-CM

## 2021-06-07 DIAGNOSIS — Z20.2 POSSIBLE EXPOSURE TO STD: ICD-10-CM

## 2021-06-07 DIAGNOSIS — Z86.19 HISTORY OF CHLAMYDIA: Primary | ICD-10-CM

## 2021-06-07 LAB
CONTROL: PRESENT
PREGNANCY TEST URINE, POC: NEGATIVE

## 2021-06-07 PROCEDURE — 81025 URINE PREGNANCY TEST: CPT | Performed by: ADVANCED PRACTICE MIDWIFE

## 2021-06-07 SDOH — ECONOMIC STABILITY: FOOD INSECURITY: WITHIN THE PAST 12 MONTHS, THE FOOD YOU BOUGHT JUST DIDN'T LAST AND YOU DIDN'T HAVE MONEY TO GET MORE.: NEVER TRUE

## 2021-06-07 SDOH — ECONOMIC STABILITY: TRANSPORTATION INSECURITY
IN THE PAST 12 MONTHS, HAS THE LACK OF TRANSPORTATION KEPT YOU FROM MEDICAL APPOINTMENTS OR FROM GETTING MEDICATIONS?: NO

## 2021-06-07 SDOH — ECONOMIC STABILITY: FOOD INSECURITY: WITHIN THE PAST 12 MONTHS, YOU WORRIED THAT YOUR FOOD WOULD RUN OUT BEFORE YOU GOT MONEY TO BUY MORE.: NEVER TRUE

## 2021-06-07 SDOH — ECONOMIC STABILITY: TRANSPORTATION INSECURITY
IN THE PAST 12 MONTHS, HAS LACK OF TRANSPORTATION KEPT YOU FROM MEETINGS, WORK, OR FROM GETTING THINGS NEEDED FOR DAILY LIVING?: NO

## 2021-06-07 ASSESSMENT — SOCIAL DETERMINANTS OF HEALTH (SDOH)
WITHIN THE LAST YEAR, HAVE TO BEEN RAPED OR FORCED TO HAVE ANY KIND OF SEXUAL ACTIVITY BY YOUR PARTNER OR EX-PARTNER?: NO
WITHIN THE LAST YEAR, HAVE YOU BEEN AFRAID OF YOUR PARTNER OR EX-PARTNER?: NO
HOW HARD IS IT FOR YOU TO PAY FOR THE VERY BASICS LIKE FOOD, HOUSING, MEDICAL CARE, AND HEATING?: NOT HARD AT ALL
WITHIN THE LAST YEAR, HAVE YOU BEEN HUMILIATED OR EMOTIONALLY ABUSED IN OTHER WAYS BY YOUR PARTNER OR EX-PARTNER?: NO
WITHIN THE LAST YEAR, HAVE YOU BEEN KICKED, HIT, SLAPPED, OR OTHERWISE PHYSICALLY HURT BY YOUR PARTNER OR EX-PARTNER?: NO

## 2021-06-08 DIAGNOSIS — Z20.2 POSSIBLE EXPOSURE TO STD: ICD-10-CM

## 2021-06-08 DIAGNOSIS — A56.8 CHLAMYDIA TRACHOMATIS INFECTION: ICD-10-CM

## 2021-06-08 LAB
DIRECT EXAM: NORMAL
Lab: NORMAL
SPECIMEN DESCRIPTION: NORMAL

## 2021-06-09 LAB
C. TRACHOMATIS DNA ,URINE: NEGATIVE
N. GONORRHOEAE DNA, URINE: NEGATIVE
SPECIMEN DESCRIPTION: NORMAL

## 2021-08-20 ENCOUNTER — OFFICE VISIT (OUTPATIENT)
Dept: PRIMARY CARE CLINIC | Age: 21
End: 2021-08-20
Payer: COMMERCIAL

## 2021-08-20 VITALS
HEIGHT: 71 IN | OXYGEN SATURATION: 100 % | SYSTOLIC BLOOD PRESSURE: 113 MMHG | HEART RATE: 98 BPM | WEIGHT: 150 LBS | TEMPERATURE: 97.8 F | DIASTOLIC BLOOD PRESSURE: 71 MMHG | BODY MASS INDEX: 21 KG/M2

## 2021-08-20 DIAGNOSIS — N92.6 MISSED MENSES: ICD-10-CM

## 2021-08-20 DIAGNOSIS — R42 DIZZINESS: Primary | ICD-10-CM

## 2021-08-20 DIAGNOSIS — R11.0 NAUSEA: ICD-10-CM

## 2021-08-20 LAB
CONTROL: NORMAL
PREGNANCY TEST URINE, POC: NORMAL

## 2021-08-20 PROCEDURE — 81025 URINE PREGNANCY TEST: CPT | Performed by: FAMILY MEDICINE

## 2021-08-20 PROCEDURE — 99213 OFFICE O/P EST LOW 20 MIN: CPT | Performed by: FAMILY MEDICINE

## 2021-08-20 RX ORDER — MECLIZINE HYDROCHLORIDE 25 MG/1
25 TABLET ORAL 3 TIMES DAILY PRN
Qty: 15 TABLET | Refills: 0 | Status: SHIPPED | OUTPATIENT
Start: 2021-08-20 | End: 2021-08-30

## 2021-08-20 ASSESSMENT — ENCOUNTER SYMPTOMS
VOMITING: 0
SORE THROAT: 0
VISUAL CHANGE: 0
DIARRHEA: 0
SHORTNESS OF BREATH: 0
COUGH: 0
CHANGE IN BOWEL HABIT: 0
WHEEZING: 0
NAUSEA: 1
ABDOMINAL PAIN: 0
RHINORRHEA: 0

## 2021-08-20 NOTE — PROGRESS NOTES
[facility] [address] [facility] [place] [facility]  [address] [address]  Dept: [phone]  Dept Fax: [phone]    Sonia Stephens is a 19 y.o. female who presents today for her medical conditions/complaintsas noted below. Sonia Stephens is c/o of Nausea (pt states she woke up with nausea and felt dizzy earlier today- she would like a note for work )        HPI:     Dizziness  This is a new problem. The current episode started today. The problem occurs intermittently. The problem has been resolved. Associated symptoms include nausea. Pertinent negatives include no abdominal pain, change in bowel habit, chills, congestion, coughing, fever, headaches, myalgias, rash, sore throat, urinary symptoms, visual change or vomiting. Nothing aggravates the symptoms. She has tried rest for the symptoms. The treatment provided mild relief. Past Medical History:   Diagnosis Date    Irregular menses     Past medical history reviewed and pertinent positives/negatives in the HPI    Past Surgical History:   Procedure Laterality Date    INTRAUTERINE DEVICE INSERTION  12/24/2019    Mirena.  Lot [id number]  Exp 03/2020    INTRAUTERINE DEVICE REMOVAL  10/26/2020    TONSILLECTOMY AND ADENOIDECTOMY  age 1       Family History   Problem Relation Age of Onset    Other Maternal Grandmother         lung cancer, smoker    Cancer Maternal Grandmother         uterine cancer, 30s to 45s       Social History     Tobacco Use    Smoking status: Never Smoker    Smokeless tobacco: Never Used   Substance Use Topics    Alcohol use: Yes     Comment: rarely      Current Outpatient Medications   Medication Sig Dispense Refill    meclizine (ANTIVERT) 25 MG tablet Take 1 tablet by mouth 3 times daily as needed for Dizziness or Nausea 15 tablet 0    norethindrone-ethinyl estradiol (JUNEL FE 1/20) 1-20 MG-MCG per tablet Take 1 tablet by mouth daily 3 packet 3    phenazopyridine (PYRIDIUM) 100 MG tablet Take 1 tablet by mouth 3 times daily as needed for Pain (Patient not taking: Reported on 8/20/2021) 20 tablet 0     No current facility-administered medications for this visit. No Known Allergies    Health Maintenance   Topic Date Due    Hepatitis C screen  Never done    Varicella vaccine (1 of 2 - 2-dose childhood series) Never done    HPV vaccine (1 - 2-dose series) Never done    COVID-19 Vaccine (1) Never done    DTaP/Tdap/Td vaccine (1 - Tdap) Never done    Flu vaccine (1) 09/01/2021    Chlamydia screen  06/07/2022    Cervical cancer screen  11/27/2022    HIV screen  Completed    Hepatitis A vaccine  Aged Out    Hepatitis B vaccine  Aged Out    Hib vaccine  Aged Out    Meningococcal (ACWY) vaccine  Aged Out    Pneumococcal 0-64 years Vaccine  Aged Out       :      Review of Systems   Constitutional: Negative for chills and fever. HENT: Negative for congestion, ear pain, rhinorrhea and sore throat. Respiratory: Negative for cough, shortness of breath and wheezing. Cardiovascular: Negative for palpitations. Gastrointestinal: Positive for nausea. Negative for abdominal pain, change in bowel habit, diarrhea and vomiting. Musculoskeletal: Negative for myalgias. Skin: Negative for pallor and rash. Neurological: Positive for dizziness. Negative for headaches. Hematological: Negative for adenopathy. Objective:     Physical Exam  Vitals and nursing note reviewed. Constitutional:       Appearance: Normal appearance. HENT:      Head: Normocephalic and atraumatic. Right Ear: Hearing, tympanic membrane, ear canal and external ear normal.      Left Ear: Hearing, tympanic membrane, ear canal and external ear normal.      Nose: Nose normal.      Mouth/Throat:      Lips: Pink. Mouth: Mucous membranes are moist.      Pharynx: Oropharynx is clear. Eyes:      Extraocular Movements: Extraocular movements intact.       Conjunctiva/sclera: Conjunctivae normal.      Pupils: Pupils are equal, round, and reactive to light. Cardiovascular:      Rate and Rhythm: Normal rate and regular rhythm. Heart sounds: Normal heart sounds. Pulmonary:      Effort: Pulmonary effort is normal.      Breath sounds: Normal breath sounds. Musculoskeletal:      Cervical back: Normal range of motion. No muscular tenderness. Skin:     General: Skin is warm and dry. Neurological:      General: No focal deficit present. Mental Status: She is alert and oriented to person, place, and time. Mental status is at baseline. Psychiatric:         Mood and Affect: Mood normal.         Behavior: Behavior normal.         Thought Content: Thought content normal.         Judgment: Judgment normal.       /71 (Site: Left Upper Arm, Position: Sitting, Cuff Size: Large Adult)   Pulse 98   Temp 97.8 °F (36.6 °C) (Tympanic)   Ht 5' 11\" (1.803 m)   Wt 150 lb (68 kg)   SpO2 100%   Breastfeeding No   BMI 20.92 kg/m²     Assessment:       Diagnosis Orders   1. Dizziness  Basic Metabolic Panel    EKG 12 Lead   2. Missed menses  POCT urine pregnancy   3.  Nausea  Basic Metabolic Panel    EKG 12 Lead       Plan:   Pregnancy test in office negative   have lab work and EKG to rule out other causes of dizziness  May try taking meclizine as needed for dizziness nausea drink plenty of fluids  If symptoms worsen or do not improve please follow-up with PCP or return to clinic    Orders Placed This Encounter   Procedures    Basic Metabolic Panel     Standing Status:   Future     Standing Expiration Date:   8/20/2022    POCT urine pregnancy    EKG 12 Lead     Standing Status:   Future     Standing Expiration Date:   8/20/2022     Order Specific Question:   Reason for Exam?     Answer:   Dizziness     Orders Placed This Encounter   Medications    meclizine (ANTIVERT) 25 MG tablet     Sig: Take 1 tablet by mouth 3 times daily as needed for Dizziness or Nausea     Dispense:  15 tablet     Refill:  0      Patient given educational materials - see patient instructions. Discussed use, benefit, and side effects of prescribed medications. All patient questions answered. Pt voiced understanding. Patient agreed with treatment plan. Follow up as directed.      Electronicallysigned by Demarcus Winn MD on 8/20/2021 at 4:17 PM

## 2021-08-20 NOTE — PATIENT INSTRUCTIONS
Patient Education        Dizziness: Care Instructions  Your Care Instructions  Dizziness is the feeling of unsteadiness or fuzziness in your head. It is different than having vertigo, which is a feeling that the room is spinning or that you are moving or falling. It is also different from lightheadedness, which is the feeling that you are about to faint. It can be hard to know what causes dizziness. Some people feel dizzy when they have migraine headaches. Sometimes bouts of flu can make you feel dizzy. Some medical conditions, such as heart problems or high blood pressure, can make you feel dizzy. Many medicines can cause dizziness, including medicines for high blood pressure, pain, or anxiety. If a medicine causes your symptoms, your doctor may recommend that you stop or change the medicine. If it is a problem with your heart, you may need medicine to help your heart work better. If there is no clear reason for your symptoms, your doctor may suggest watching and waiting for a while to see if the dizziness goes away on its own. Follow-up care is a key part of your treatment and safety. Be sure to make and go to all appointments, and call your doctor if you are having problems. It's also a good idea to know your test results and keep a list of the medicines you take. How can you care for yourself at home? · If your doctor recommends or prescribes medicine, take it exactly as directed. Call your doctor if you think you are having a problem with your medicine. · Do not drive while you feel dizzy. · Try to prevent falls. Steps you can take include:  ? Using nonskid mats, adding grab bars near the tub, and using night-lights. ? Clearing your home so that walkways are free of anything you might trip on.  ? Letting family and friends know that you have been feeling dizzy. This will help them know how to help you. When should you call for help? Call 911 anytime you think you may need emergency care.  For example, call if:    · You passed out (lost consciousness).     · You have dizziness along with symptoms of a heart attack. These may include:  ? Chest pain or pressure, or a strange feeling in the chest.  ? Sweating. ? Shortness of breath. ? Nausea or vomiting. ? Pain, pressure, or a strange feeling in the back, neck, jaw, or upper belly or in one or both shoulders or arms. ? Lightheadedness or sudden weakness. ? A fast or irregular heartbeat.     · You have symptoms of a stroke. These may include:  ? Sudden numbness, tingling, weakness, or loss of movement in your face, arm, or leg, especially on only one side of your body. ? Sudden vision changes. ? Sudden trouble speaking. ? Sudden confusion or trouble understanding simple statements. ? Sudden problems with walking or balance. ? A sudden, severe headache that is different from past headaches. Call your doctor now or seek immediate medical care if:    · You feel dizzy and have a fever, headache, or ringing in your ears.     · You have new or increased nausea and vomiting.     · Your dizziness does not go away or comes back. Watch closely for changes in your health, and be sure to contact your doctor if:    · You do not get better as expected. Where can you learn more? Go to https://[employer] org and sign in to your [employer] account. Enter P409 in the [facility] box to learn more about \"Dizziness: Care Instructions. \"     If you do not have an account, please click on the \"Sign Up Now\" link. Current as of: October 19, 2020               Content Version: 12.9  © 7065-3784 [employer]. Care instructions adapted under license by [facility] ([facility]). If you have questions about a medical condition or this instruction, always ask your healthcare professional. [place] [address] any warranty or liability for your use of this information.        Pregnancy test in office negative  Have lab work and EKG to rule out other causes of dizziness  May try taking meclizine as needed for dizziness nausea drink plenty of fluids  If symptoms worsen or do not improve please follow-up with PCP or return to clinic

## 2021-08-20 NOTE — LETTER
[facility]  [place] [address]  Phone: [phone]  Fax: [phone]    Faith Calix MD        August 20, 2021     Patient: Frederick Valdes   YOB: 2000   Date of Visit: 8/20/2021       To Whom it May Concern:    Frederick Valdes was seen in my clinic on 8/20/2021. She is to be excused from work 8/20/21. If you have any questions or concerns, please don't hesitate to call.     Sincerely,           Faith Calix MD

## 2021-08-31 ENCOUNTER — TELEPHONE (OUTPATIENT)
Dept: PEDIATRICS CLINIC | Age: 21
End: 2021-08-31

## 2021-08-31 DIAGNOSIS — N92.6 IRREGULAR MENSES: ICD-10-CM

## 2021-08-31 DIAGNOSIS — Z30.41 ENCOUNTER FOR SURVEILLANCE OF CONTRACEPTIVE PILLS: ICD-10-CM

## 2021-08-31 RX ORDER — NORETHINDRONE ACETATE AND ETHINYL ESTRADIOL 1MG-20(21)
1 KIT ORAL DAILY
Qty: 3 PACKET | Refills: 1 | Status: SHIPPED | OUTPATIENT
Start: 2021-08-31 | End: 2021-09-20 | Stop reason: SDUPTHER

## 2021-08-31 NOTE — TELEPHONE ENCOUNTER
PC from the patient stating she has an appointment for a pap/pelvic for 09/20/21 but will be out of her birth control this Sunday could she have 1 month called into [facility] [address].

## 2021-09-20 ENCOUNTER — OFFICE VISIT (OUTPATIENT)
Dept: OBGYN CLINIC | Age: 21
End: 2021-09-20
Payer: COMMERCIAL

## 2021-09-20 ENCOUNTER — HOSPITAL ENCOUNTER (OUTPATIENT)
Age: 21
Setting detail: SPECIMEN
Discharge: HOME | End: 2021-09-20
Payer: COMMERCIAL

## 2021-09-20 VITALS
BODY MASS INDEX: 20.24 KG/M2 | SYSTOLIC BLOOD PRESSURE: 97 MMHG | HEART RATE: 91 BPM | HEIGHT: 71 IN | WEIGHT: 144.6 LBS | DIASTOLIC BLOOD PRESSURE: 64 MMHG

## 2021-09-20 DIAGNOSIS — Z20.2 POSSIBLE EXPOSURE TO STD: ICD-10-CM

## 2021-09-20 DIAGNOSIS — Z30.41 ENCOUNTER FOR SURVEILLANCE OF CONTRACEPTIVE PILLS: ICD-10-CM

## 2021-09-20 DIAGNOSIS — Z01.419 WOMEN'S ANNUAL ROUTINE GYNECOLOGICAL EXAMINATION: Primary | ICD-10-CM

## 2021-09-20 DIAGNOSIS — N92.6 IRREGULAR MENSES: ICD-10-CM

## 2021-09-20 PROCEDURE — 99395 PREV VISIT EST AGE 18-39: CPT | Performed by: ADVANCED PRACTICE MIDWIFE

## 2021-09-20 RX ORDER — NORETHINDRONE ACETATE AND ETHINYL ESTRADIOL 1MG-20(21)
1 KIT ORAL DAILY
Qty: 3 PACKET | Refills: 4 | Status: SHIPPED | OUTPATIENT
Start: 2021-09-20 | End: 2022-03-03

## 2021-09-20 ASSESSMENT — PATIENT HEALTH QUESTIONNAIRE - PHQ9
SUM OF ALL RESPONSES TO PHQ9 QUESTIONS 1 & 2: 0
SUM OF ALL RESPONSES TO PHQ QUESTIONS 1-9: 0
1. LITTLE INTEREST OR PLEASURE IN DOING THINGS: 0
SUM OF ALL RESPONSES TO PHQ QUESTIONS 1-9: 0
2. FEELING DOWN, DEPRESSED OR HOPELESS: 0
SUM OF ALL RESPONSES TO PHQ QUESTIONS 1-9: 0

## 2021-09-20 ASSESSMENT — ENCOUNTER SYMPTOMS
NAUSEA: 0
SHORTNESS OF BREATH: 0
ABDOMINAL PAIN: 0
CONSTIPATION: 0
GASTROINTESTINAL NEGATIVE: 1
EYES NEGATIVE: 1
VOMITING: 0
ALLERGIC/IMMUNOLOGIC NEGATIVE: 1
RESPIRATORY NEGATIVE: 1

## 2021-09-20 ASSESSMENT — ANXIETY QUESTIONNAIRES
4. TROUBLE RELAXING: 0-NOT AT ALL
6. BECOMING EASILY ANNOYED OR IRRITABLE: 0-NOT AT ALL
3. WORRYING TOO MUCH ABOUT DIFFERENT THINGS: 0-NOT AT ALL
7. FEELING AFRAID AS IF SOMETHING AWFUL MIGHT HAPPEN: 0-NOT AT ALL
5. BEING SO RESTLESS THAT IT IS HARD TO SIT STILL: 0-NOT AT ALL
1. FEELING NERVOUS, ANXIOUS, OR ON EDGE: 0-NOT AT ALL
GAD7 TOTAL SCORE: 0
2. NOT BEING ABLE TO STOP OR CONTROL WORRYING: 0-NOT AT ALL

## 2021-09-20 NOTE — PROGRESS NOTES
[facility] AND GYNECOLOGY   [place] [address] [address] Dilip [address]aines [address] [place] [id number]  Dept: [phone]    Patient Name: Michelle Olsen  Patient Age: 19 y.o. Date of Visit: 9/20/2021    Subjective  Chief Complaint   Patient presents with    Gynecologic Exam     Last pap: 11/27/19 / Denied chaperone      HPI:  Michelle Olsen is a 19 y.o. female who arrives to office as an established patient for annual exam.     Reports taking OCPs without issue, only concern is she had a sharp cramp on her period yesterday. variable flow for about a week. Switched time of taking BCP due to job shift change. Patient reports is sexually active with 1 male partner(s). Patient denies pain with sex. Patient has a history of sexually transmitted infection(s). CT  Patient does want screening for sexually transmitted infection(s). Reports is  on contraception OCP for irregular menses. Review of Systems   Constitutional: Negative. Negative for chills, fever and unexpected weight change. HENT: Negative. Eyes: Negative. Respiratory: Negative. Negative for shortness of breath. Cardiovascular: Negative. Negative for chest pain and palpitations. Gastrointestinal: Negative. Negative for abdominal pain, constipation, nausea and vomiting. Endocrine: Negative. Genitourinary: Positive for menstrual problem (cramps with menses, on OCP). Negative for dyspareunia, dysuria, flank pain, frequency, genital sores, pelvic pain and vaginal discharge. Musculoskeletal: Negative. Skin: Negative. Allergic/Immunologic: Negative. Neurological: Negative. Negative for headaches. Hematological: Negative. Psychiatric/Behavioral: Negative.       Preventive Health Screening:   Date of last pap: normal 11/27/2019  History of abnormal pap: denies          HPV typing/date: N/A   Date of last mammogram: N/A   Date of last DEXA scan: N/A   Date of last colonoscopy: N/A    History of Gestational Diabetes: No     If Yes, Glucose screening ordered: N/A    Preventive screening: Yes, with PCP    Family history of Breast, Ovarian, Colon or Uterine Cancer:  Uterine cancer MGMA 30s-40s, attempted BRCA testing and insurance declined, pt declines further attempt at testing at this time. If Yes see scanned worksheet    PHQ Scores 9/20/2021 1/4/2021 1/22/2020 11/27/2019   PHQ2 Score 0 0 0 0   PHQ9 Score 0 0 0 0     Interpretation of Total Score Depression Severity: 1-4 = Minimal depression, 5-9 = Mild depression, 10-14 = Moderate depression, 15-19 = Moderately severe depression, 20-27 = Severe depression  JOVANNI 7 SCORE 9/20/2021   JOVANNI-7 Total Score 0     Interpretation of JOVANNI-7 score: 5-9 = mild anxiety, 10-14 = moderate anxiety, 15+ = severe anxiety. Recommend referral to behavioral health for scores 10 or greater. Social Determinants of Health:   Social History     Tobacco Use   Smoking Status Never Smoker   Smokeless Tobacco Never Used      Social History     Substance and Sexual Activity   Alcohol Use Yes    Comment: rarely      Social History     Substance and Sexual Activity   Drug Use No         Financial Resource Strain: Low Risk     Difficulty of Paying Living Expenses: Not hard at all         Food Insecurity: No Food Insecurity    Worried About Running Out of Food in the Last Year: Never true    [place] of Food in the Last Year: Never true         Transportation Needs: No Transportation Needs    Lack of Transportation (Medical): No    Lack of Transportation (Non-Medical): No         Intimate Partner Violence: Not At Risk    Fear of Current or Ex-Partner: No    Emotionally Abused: No    Physically Abused: No    Sexually Abused: No     Objective  Blood pressure 97/64, pulse 91, height 5' 11\" (1.803 m), weight 144 lb 9.6 oz (65.6 kg), last menstrual period 09/17/2021, not currently breastfeeding. Patient's last menstrual period was 09/17/2021 (exact date).  Body mass index is 20.17 kg/m². Current Outpatient Medications on File Prior to Visit   Medication Sig Dispense Refill    phenazopyridine (PYRIDIUM) 100 MG tablet Take 1 tablet by mouth 3 times daily as needed for Pain (Patient not taking: Reported on 8/20/2021) 20 tablet 0     No current facility-administered medications on file prior to visit. Past Medical History:   Diagnosis Date    Irregular menses      Gynecologic History:  Menarche: 10  Monthly menses (days): regular on OCP  Length: 7  Flow: moderate, variable     Gardasil Series: unsure - going to check and follow up if desired    Sexually active: Yes  New Sex Partner within 3 months: No  Domestic Violence Screening: negative    STD history: Yes CT    Birth control method: Yes OCPs    Physical Exam  Vitals reviewed. Constitutional:       General: She is not in acute distress. Appearance: Normal appearance. She is normal weight. She is not ill-appearing, toxic-appearing or diaphoretic. HENT:      Head: Normocephalic and atraumatic. Cardiovascular:      Rate and Rhythm: Normal rate and regular rhythm. Heart sounds: Normal heart sounds. No murmur heard. Pulmonary:      Effort: Pulmonary effort is normal. No respiratory distress. Breath sounds: Normal breath sounds. Chest:      Comments: deferred  Abdominal:      Palpations: Abdomen is soft. Genitourinary:     Comments: Shared decision making - deferred  Skin:     General: Skin is warm and dry. Capillary Refill: Capillary refill takes less than 2 seconds. Neurological:      Mental Status: She is alert and oriented to person, place, and time. Mental status is at baseline. Psychiatric:         Mood and Affect: Mood normal.         Behavior: Behavior normal.         Thought Content:  Thought content normal.         Judgment: Judgment normal.       Assessment/Plan:  Women's annual routine gynecological examination  General Health:  [x] Alcohol screening & counseling -negative  [x] Blood pressure screening: normal  [x] Contraceptive counseling & methods: OCP  [x] Depression Screening: Negative  [] Diabetes Screening   [] Folic acid supplementation  [] Healthful diet & activity counseling  [x] Interpersonal violence screening: Negative  [] Lipid screening  [x] Obesity Screening: Body mass index is 20.17 kg/m². [] Osteoporosis screening  [x] Substance use screening & counseling- negative  [x] Tobacco screening & counseling- negative  [] Urinary incontinence screening    Infectious Diseases:  [x] Gonorrhea & chlamydia screening: done  [] Hepatitis C Screening  [] HIV risk assessment/ testing (at least once in lifetime)   [x] Immunizations: recommend finding out if she received Gardasil series, will follow up if she desires series   [x] STI prevention counseling    Cancer:  [] Cervical cancer screening: due 2022  [] Mammograms (started at 37yrs old)  [] BRCA testing risk assessment: Fhx uterine cancer, not covered by insurance. Deferred at this time, f/u next year  [] Colon cancer screening  [] Skin Cancer Screening: not discussed    Encounter for surveillance of contraceptive pills, hx irregular menses  Discussed OCP correct use and side effects. Reviewed ACHES  Use barrier protection for STI prevention at all times   · norethindrone-ethinyl estradiol (JUNEL FE 1/20) 1-20 MG-MCG per tablet; Take 1 tablet by mouth daily  · RTO in 1 year for refills/follow up     Possible exposure to STD  · C.trachomatis N.gonorrhoeae DNA, Urine; Future      Return in about 1 year (around 9/20/2022) for Annual exam.    Problem list reviewed and updated as indicated. Upon completion of the visit all questions were answered. History was reviewed as documented on Epic Navigator.     The patient, Anat Julio, was seen with a total time spent of 30 minutes for the visit on this date of service by the [facility]  The time component involved both face-to-face (counseling and education) and non face-to-face time (care coordination), spent in determining the total time component.       Electronically Signed by: YADIRA Hernandez CNM

## 2021-09-21 ENCOUNTER — TELEPHONE (OUTPATIENT)
Dept: OBGYN CLINIC | Age: 21
End: 2021-09-21

## 2021-09-21 DIAGNOSIS — Z01.419 WOMEN'S ANNUAL ROUTINE GYNECOLOGICAL EXAMINATION: ICD-10-CM

## 2021-09-21 DIAGNOSIS — Z20.2 POSSIBLE EXPOSURE TO STD: ICD-10-CM

## 2021-09-21 LAB
-: NORMAL
REASON FOR REJECTION: NORMAL
ZZ NTE CLEAN UP: ORDERED TEST: NORMAL
ZZ NTE WITH NAME CLEAN UP: SPECIMEN SOURCE: NORMAL

## 2021-09-21 NOTE — TELEPHONE ENCOUNTER
[address] [facility]     I agree to have a Peripherally Inserted Central Catheter (PICC) placed in my arm.    1. The PICC insertion procedure, care, maintenance, risks, benefits, and complications have been explained to me b Call center called and stated they had Valentino Plan from [facility] lab was on the other line with results. However, when she caitlin to transfer [facility] labs was no longer there. the PICC, including risks, benefits, and side effects related to the alternatives and risks related to not receiving this procedure.     8.  I have expressed any questions about this procedure to my physician or the PICC Proceduralist and he/she has answere

## 2021-09-23 DIAGNOSIS — Z20.2 POSSIBLE EXPOSURE TO STD: Primary | ICD-10-CM

## 2021-09-29 ENCOUNTER — HOSPITAL ENCOUNTER (OUTPATIENT)
Age: 21
Setting detail: SPECIMEN
Discharge: HOME | End: 2021-09-29
Payer: COMMERCIAL

## 2021-09-30 DIAGNOSIS — Z20.2 POSSIBLE EXPOSURE TO STD: ICD-10-CM

## 2021-10-18 ENCOUNTER — NURSE ONLY (OUTPATIENT)
Dept: OBGYN CLINIC | Age: 21
End: 2021-10-18
Payer: COMMERCIAL

## 2021-10-18 ENCOUNTER — PATIENT MESSAGE (OUTPATIENT)
Dept: OBGYN CLINIC | Age: 21
End: 2021-10-18

## 2021-10-18 VITALS
DIASTOLIC BLOOD PRESSURE: 63 MMHG | SYSTOLIC BLOOD PRESSURE: 111 MMHG | WEIGHT: 145.4 LBS | HEIGHT: 71 IN | BODY MASS INDEX: 20.35 KG/M2

## 2021-10-18 DIAGNOSIS — N89.8 VAGINAL DISCHARGE: Primary | ICD-10-CM

## 2021-10-18 DIAGNOSIS — R30.0 DYSURIA: ICD-10-CM

## 2021-10-18 DIAGNOSIS — R30.0 BURNING WITH URINATION: ICD-10-CM

## 2021-10-18 PROCEDURE — 99211 OFF/OP EST MAY X REQ PHY/QHP: CPT | Performed by: ADVANCED PRACTICE MIDWIFE

## 2021-10-18 NOTE — TELEPHONE ENCOUNTER
From: Jassi Egan  To: YADIRA CameronM  Sent: 10/18/2021 8:16 AM EDT  Subject: Visit Follow-Up Question    I think I have a UTI or BV, I scheduled an appt but it isn't for another 2 weeks I wasn't sure if there was anything we could do in the meantime?

## 2021-10-18 NOTE — TELEPHONE ENCOUNTER
Please call Trent Melchor to set up a time for her to do a self swab (Affirm) and U/C. Orders have been placed. Thanks!

## 2021-10-19 ENCOUNTER — HOSPITAL ENCOUNTER (OUTPATIENT)
Age: 21
Setting detail: SPECIMEN
Discharge: HOME | End: 2021-10-19
Payer: COMMERCIAL

## 2021-10-19 DIAGNOSIS — R30.0 BURNING WITH URINATION: ICD-10-CM

## 2021-10-19 DIAGNOSIS — N89.8 VAGINAL DISCHARGE: ICD-10-CM

## 2021-10-19 LAB
DIRECT EXAM: ABNORMAL
Lab: ABNORMAL
SPECIMEN DESCRIPTION: ABNORMAL

## 2021-10-20 DIAGNOSIS — B96.89 BV (BACTERIAL VAGINOSIS): Primary | ICD-10-CM

## 2021-10-20 DIAGNOSIS — N76.0 BV (BACTERIAL VAGINOSIS): Primary | ICD-10-CM

## 2021-10-20 LAB
CULTURE: NORMAL
Lab: NORMAL
SPECIMEN DESCRIPTION: NORMAL

## 2021-10-20 RX ORDER — METRONIDAZOLE 500 MG/1
500 TABLET ORAL 2 TIMES DAILY
Qty: 14 TABLET | Refills: 0 | Status: SHIPPED | OUTPATIENT
Start: 2021-10-20 | End: 2021-10-27

## 2022-02-06 ENCOUNTER — PATIENT MESSAGE (OUTPATIENT)
Dept: OBGYN CLINIC | Age: 22
End: 2022-02-06

## 2022-02-07 NOTE — TELEPHONE ENCOUNTER
From: Adolfo Clark  To: YADIRA Verde CNM  Sent: 2/6/2022 11:41 AM EST  Subject: Medical question     Hi, so I am 12 days late on my period and I have had random cramping that has woke me out of my sleep and are kind of painful, they go away after 5 minutes and it only occurs once maybe twice a day. . and today I coughed and felt a weird pull in my right ovary area. .I dont know if its anything to worry about but I just wanted to check in. Lincoln Garcia

## 2022-03-03 ENCOUNTER — HOSPITAL ENCOUNTER (OUTPATIENT)
Age: 22
Setting detail: SPECIMEN
Discharge: HOME | End: 2022-03-03

## 2022-03-03 ENCOUNTER — OFFICE VISIT (OUTPATIENT)
Dept: OBGYN CLINIC | Age: 22
End: 2022-03-03
Payer: COMMERCIAL

## 2022-03-03 VITALS
SYSTOLIC BLOOD PRESSURE: 114 MMHG | BODY MASS INDEX: 20.17 KG/M2 | HEIGHT: 71 IN | DIASTOLIC BLOOD PRESSURE: 55 MMHG | HEART RATE: 69 BPM | WEIGHT: 144.1 LBS

## 2022-03-03 DIAGNOSIS — Z34.01 ENCOUNTER FOR SUPERVISION OF NORMAL FIRST PREGNANCY IN FIRST TRIMESTER: ICD-10-CM

## 2022-03-03 DIAGNOSIS — N92.6 MISSED MENSES: Primary | ICD-10-CM

## 2022-03-03 DIAGNOSIS — Z32.01 POSITIVE PREGNANCY TEST: ICD-10-CM

## 2022-03-03 LAB
CONTROL: YES
PREGNANCY TEST URINE, POC: POSITIVE

## 2022-03-03 PROCEDURE — 81025 URINE PREGNANCY TEST: CPT | Performed by: ADVANCED PRACTICE MIDWIFE

## 2022-03-03 PROCEDURE — 99214 OFFICE O/P EST MOD 30 MIN: CPT | Performed by: ADVANCED PRACTICE MIDWIFE

## 2022-03-03 RX ORDER — PNV NO.95/FERROUS FUM/FOLIC AC 28MG-0.8MG
1 TABLET ORAL DAILY
Qty: 30 TABLET | Refills: 11 | Status: SHIPPED | OUTPATIENT
Start: 2022-03-03

## 2022-03-03 NOTE — PROGRESS NOTES
SUBJECTIVE:    Chief Complaint:  Chief Complaint   Patient presents with    Amenorrhea     lmp 2021      HPI:  Matthew Eldridge is a 23 y.o. female being seen today for missed menses. She reports a positive pregnancy test on 22. This is not a planned pregnancy. She is accepting at this time. LMP: Patient's last menstrual period was 2021. Sure and definite: Yes. Regular monthly cycle: No    She was not on a contraceptive at the time of conception. Estimated weeks gestation today: 9w3d  Tentative KAMRYN: 10/3/2022    Relationship with FOB: Alyse Hanna    OBJECTIVE:    BP (!) 114/55   Pulse 69   Ht 5' 11\" (1.803 m)   Wt 144 lb 1.6 oz (65.4 kg)   LMP 2021  Body mass index is 20.1 kg/m².  bpm via doppler    Mother's ethnicity:  AA/  Father's ethnicity:      She is complaining today of:   Pain: No  Cramping: No  Bleeding or spotting: No    Nausea: No  Vomiting: No    Breast enlargement/tenderness: Yes  Fatigue: Yes  Frequency of urination: Yes    Previous high risk obstetrical history: negative   OB History    Para Term  AB Living   1             SAB IAB Ectopic Molar Multiple Live Births                    # Outcome Date GA Lbr Greg/2nd Weight Sex Delivery Anes PTL Lv   1 Current                ROS was done and is negative except as documented in HPI. History was reviewed as documented on Epic Navigator. ASSESSMENT/PLAN:  Missed menses with + UCG  · UCG was done and noted as positive  · Prenatal vitamins were ordered: Yes  · Reviewed recommended folic acid intake and importance for preventing neural tube defects. · Discussed self-help for nausea, avoiding OTC medications until consulting provider or pharmacist.  · Ultrasound for dating and viability was ordered and instructed to complete before OB Hx visit: Yes   · Initial information on genetic testing was given:  Yes  · Reviewed option for 1st trimester screening (NT screen at MFM) at 10-13 weeks or Myriad NIPS which will be drawn at next provider visit (as long as > 10 weeks GA). · Reviewed initial prenatal labs will be ordered today, instructed to complete before OB Hx visit. She consents to UDS and HIV: Yes  · 1 hour glucola needed: No  · She was instructed to call with any concerns or worsening of any symptoms  · Given New OB packet (nausea/vomiting in pregnancy, flu vaccine, safe medications in pregnancy, weight gain recommendations in pregnancy). · She will return for New OB intake visit    Encounter for supervision of normal first pregnancy in first trimester  -     PRENATAL PROFILE I; Future  -     HIV Screen; Future  -     C.trachomatis N.gonorrhoeae DNA, Urine; Future  -     Urine Drug Screen; Future  -     Culture, Urine; Future  -     Hepatitis C Antibody; Future  -     Varicella Zoster Antibody, IgG; Future       Problem List updated after visit. Return in about 3 weeks (around 3/24/2022) for OB history with Flakita. The patient, Priscila Chavez, was seen with a total time spent of 30 minutes for the visit on this date of service by the [facility]  The time component involved both face-to-face (counseling and education) and non face-to-face time (care coordination), spent in determining the total time component.       Electronically signed by: YADIRA Bourgeois CNM

## 2022-03-03 NOTE — PROGRESS NOTES
Pt is here today for her AMENORRHEA appointment  LMP: 12/27/2021  Patient has no questions or concerns

## 2022-03-04 PROBLEM — R82.5 POSITIVE URINE DRUG SCREEN: Status: ACTIVE | Noted: 2022-03-04

## 2022-03-04 LAB
AMPHETAMINE SCREEN URINE: NEGATIVE
BARBITURATE SCREEN URINE: NEGATIVE
BENZODIAZEPINE SCREEN, URINE: NEGATIVE
C. TRACHOMATIS DNA ,URINE: NEGATIVE
CANNABINOID SCREEN URINE: POSITIVE
COCAINE METABOLITE, URINE: NEGATIVE
CULTURE: NO GROWTH
METHADONE SCREEN, URINE: NEGATIVE
N. GONORRHOEAE DNA, URINE: NEGATIVE
OPIATES, URINE: NEGATIVE
OXYCODONE SCREEN URINE: NEGATIVE
PHENCYCLIDINE, URINE: NEGATIVE
SPECIMEN DESCRIPTION: NORMAL
SPECIMEN DESCRIPTION: NORMAL
TEST INFORMATION: ABNORMAL

## 2022-03-07 ENCOUNTER — HOSPITAL ENCOUNTER (OUTPATIENT)
Dept: ULTRASOUND IMAGING | Age: 22
Discharge: HOME | End: 2022-03-09
Payer: COMMERCIAL

## 2022-03-07 DIAGNOSIS — Z32.01 POSITIVE PREGNANCY TEST: ICD-10-CM

## 2022-03-07 PROCEDURE — 76817 TRANSVAGINAL US OBSTETRIC: CPT

## 2022-03-07 PROCEDURE — 76801 OB US < 14 WKS SINGLE FETUS: CPT

## 2022-03-22 ENCOUNTER — INITIAL PRENATAL (OUTPATIENT)
Dept: OBGYN CLINIC | Age: 22
End: 2022-03-22
Payer: COMMERCIAL

## 2022-03-22 ENCOUNTER — HOSPITAL ENCOUNTER (OUTPATIENT)
Age: 22
Setting detail: SPECIMEN
Discharge: HOME | End: 2022-03-22

## 2022-03-22 VITALS
SYSTOLIC BLOOD PRESSURE: 117 MMHG | BODY MASS INDEX: 19.87 KG/M2 | DIASTOLIC BLOOD PRESSURE: 78 MMHG | HEART RATE: 108 BPM | WEIGHT: 142.5 LBS

## 2022-03-22 DIAGNOSIS — Z3A.12 12 WEEKS GESTATION OF PREGNANCY: Primary | ICD-10-CM

## 2022-03-22 DIAGNOSIS — Z34.01 ENCOUNTER FOR SUPERVISION OF NORMAL FIRST PREGNANCY IN FIRST TRIMESTER: ICD-10-CM

## 2022-03-22 PROCEDURE — 99213 OFFICE O/P EST LOW 20 MIN: CPT | Performed by: ADVANCED PRACTICE MIDWIFE

## 2022-03-22 NOTE — PROGRESS NOTES
[facility] AND GYNECOLOGY  [place] [address] [address]  [address] [place] [address]  Dept: [phone]    New Obstetric Intake     Subjective:    Patient Edson Garcia  Patient Age: 23 y. o. Date of Visit: 3/22/2022  Patient's estimated gestational age at visit: 12w1d      Any Concerns Today: yes - ligament stretching  Patient reports improvement in symptoms. She denies spotting, cramping or pain. OB History        1    Para        Term                AB        Living           SAB        IAB        Ectopic        Molar        Multiple        Live Births                    Information about this pregnancy:    Planned Pregnancy: Yes, Accepting: Yes   Father of Baby: Moni Hopkins and is involved with the pregnancy   Patient's last menstrual period was 2021., Regular menses:  Yes   Date of Last Pap Smear:  normal   On Birth Control at Time of Conception: no   Early Dating Ultrasound: completed   Desires first trimester screening: No   Desires CF/SMA/FragileX screening: No    Risk Screening   Patient Occupation: [employer], Environmental/Work Hazards: No   Smoker: No   History of Substance Abuse: yes - marijuana   History of Sexual Abuse: no   Current of past history of intimate partner violence: no   Teratogen Exposure since finding out pregnant: no   Pets at home: yes - 2 cats    Infection History:   Personal History of Chicken Pox or varicella vaccination: Yes   Agreeable to flu shot: No   Agreeable to tdap: Yes   Exposed to TB or live with someone with TB: no   Patient or partner history of genital herpes: no   Rash or viral illness since last menstrual period: no   Prior GBS-infected child: no   History of sexually transmitted infection(s) (STIs): yes - chlamydia    Any recent travel within the last 3 months out of the country: no, Partner: no    Previous Birth History:    Vaginal Birth: no    Birth: no   Any previous birth complications: no   History of hemorrhage during/after birth: no    High Risk Factor Screening for this Pregnancy:   Age greater than 28 at delivery: No   History of  delivery: no   History of diabetes (gestational or outside of pregnancy): No, On medications: No   Screening for pregestational diabetes:   o BMI greater than 30: No. If Yes, need early glucola  o BMI greater than 25 ( Americans greater than 23): No If Yes, need 1 more risk factor.   - Physical inactivity: No  - First-degree relative with diabetes: No  - High-risk race of ethnicity (eg, [place] American, , , [place] American, [facility]): No  - Previously given birth to an infant weighing 9zan67nc (4000g) or more: No  - History of hypertension: No  - HDL < 35mg/dL and/or a trglyceride level greater than 250 mg/dL: No  - History of polycystic ovarian syndrome: No  - A1c greater than or equal to 5.7%: No   History of Hypertension: No, On medications: No   History of bleeding disorders: No    See Epic Navigator for further genetic and risk screening. Objective:  /78   Pulse 108   Wt 142 lb 8 oz (64.6 kg)   LMP 2021   BMI 19.87 kg/m²   Body mass index is 19.87 kg/m². Physical Exam    Chaperone for Intimate Exam   Chaperone was offered as part of the rooming process. Patient declined and agrees to continue with exam without a chaperone. Mother's Ethnicity:   Father's Ethnicity: -American      Assessment/Plan:  Pregnancy at 12w1d    Role of ultrasound in pregnancy discussed; fetal survey: requests   She was counseled on office policies. She was educated about the anticipated course of prenatal care.  Warning signs were reviewed.  The patient was counseled on drug screening, HIV, Cystic Fibrosis, SMA and Sickle Cell disease, as appropriate.   o She verbally consented to HIV testing and drug screening. o She undecided CF/SMA/Fragile X testing.  She was counseled on Toxoplasmosis/Listeriosis (cats/raw meat).  She denies tobacco use. The patient was counseled on the risks of tobacco abuse, both maternal and fetal. She was instructed to stop smoking if currently using tobacco. Morbidity, mortality, and cessation programs were reviewed. The risks include but are not limited to increased risks of  labor,  delivery, premature rupture of membranes, intrauterine growth restriction, intrauterine fetal demise and abruptio placenta. Secondary smoke risks were also reviewed. Increases in cancer, respiratory problems, and sudden infant death syndrome were reviewed as well.  The patient was informed of a 2-4% risk of congenital anomalies in the general population. She was questioned in detail regarding any genetic misnomer history, chromosomal abnormalities, or learning disabilities in herself, the father of the baby or their families. She denies any history as stated above.  Discussed in detail referral/orders for for 1st trimester screen vs NIPs info provided for screening. Reviewed 1st trimester screening would be performed at [facility] around 10-12 weeks and referral will need placed prior to scheduling. Discussed with patient that if they proceed with the NIPs testing then they will be opting out of 1st trimester screening which can provide information in regards to placental health, congenital heart defects, metabolic abnormalities, and anatomic abnormalities. She verbalizes understanding and would like to proceed with: Declines    Single Marker MSAFP testing was not ordered with attention to timing. She was informed that karyotyping is the only way to evaluate the fetus for genetic problems and genetic lethal anomalies. Amniocentesis was discussed: Not indicated.  Route of delivery and counseling on vaginal, operative vaginal, and  sections were discussed.   Further counseling will be handled by the provider at subsequent visits.  Encouraged well-balanced diet, plenty of rest when needed, prenatal vitamins daily and walking for exercise. Discussed self-help for nausea, avoiding OTC medications until consulting provider or pharmacist, other than Tylenol PRN, minimal caffeine (1-2 cups daily) and avoiding alcohol. Discussed exercise safety in pregnancy.  The patient was informed that the [facility] only delivers at [address] and is agreeable to delivery at [address].  She was made aware of the Midwife Philosophy against Elective Induction. 1. 12 weeks gestation of pregnancy    - PAP Smear; Future    2. Encounter for supervision of normal first pregnancy in first trimester        Upon completion of the visit all questions were answered. ROS was done and is negative except as documented in HPI. History was reviewed as documented on Epic Navigator. The patient, Shane Corey, was seen with a total time spent of 25 minutes for the visit on this date of service by the [facility]  The time component involved both face-to-face (counseling and education) and non face-to-face time (care coordination), spent in determining the total time component. No follow-ups on file.     Electronically Signed by: YADIRA Conklin CNM

## 2022-03-22 NOTE — PROGRESS NOTES
Pt is here today at her 12w1 prenatal visit  Pt states fetal movement is absent  Pt has an occasional pulling pain on the [facility]

## 2022-03-28 LAB — CYTOLOGY REPORT: NORMAL

## 2022-04-11 ENCOUNTER — TELEPHONE (OUTPATIENT)
Dept: OBGYN CLINIC | Age: 22
End: 2022-04-11

## 2022-04-11 NOTE — TELEPHONE ENCOUNTER
Called and spoke with pt to have her complete routine prenatal lab work ordered 3/3/22 at least by her next appt

## 2022-04-13 ENCOUNTER — HOSPITAL ENCOUNTER (OUTPATIENT)
Age: 22
Discharge: HOME | End: 2022-04-13
Payer: COMMERCIAL

## 2022-04-13 DIAGNOSIS — Z34.01 ENCOUNTER FOR SUPERVISION OF NORMAL FIRST PREGNANCY IN FIRST TRIMESTER: ICD-10-CM

## 2022-04-13 LAB
ABO/RH: NORMAL
ABSOLUTE EOS #: 0.22 K/UL (ref 0–0.44)
ABSOLUTE IMMATURE GRANULOCYTE: 0.04 K/UL (ref 0–0.3)
ABSOLUTE LYMPH #: 2.27 K/UL (ref 1.1–3.7)
ABSOLUTE MONO #: 0.57 K/UL (ref 0.1–1.2)
ANTIBODY SCREEN: NEGATIVE
BASOPHILS # BLD: 1 % (ref 0–2)
BASOPHILS ABSOLUTE: 0.06 K/UL (ref 0–0.2)
EOSINOPHILS RELATIVE PERCENT: 2 % (ref 1–4)
HCT VFR BLD CALC: 32.2 % (ref 36.3–47.1)
HEMOGLOBIN: 10.3 G/DL (ref 11.9–15.1)
HEPATITIS B SURFACE ANTIGEN: NONREACTIVE
HEPATITIS C ANTIBODY: NONREACTIVE
HIV AG/AB: NONREACTIVE
IMMATURE GRANULOCYTES: 0 %
LYMPHOCYTES # BLD: 22 % (ref 24–43)
MCH RBC QN AUTO: 26.4 PG (ref 25.2–33.5)
MCHC RBC AUTO-ENTMCNC: 32 G/DL (ref 28.4–34.8)
MCV RBC AUTO: 82.6 FL (ref 82.6–102.9)
MONOCYTES # BLD: 6 % (ref 3–12)
NRBC AUTOMATED: 0 PER 100 WBC
PDW BLD-RTO: 13.8 % (ref 11.8–14.4)
PLATELET # BLD: 263 K/UL (ref 138–453)
PMV BLD AUTO: 11 FL (ref 8.1–13.5)
RBC # BLD: 3.9 M/UL (ref 3.95–5.11)
RUBV IGG SER QL: 49.3 IU/ML
SEG NEUTROPHILS: 69 % (ref 36–65)
SEGMENTED NEUTROPHILS ABSOLUTE COUNT: 7.15 K/UL (ref 1.5–8.1)
T. PALLIDUM, IGG: NONREACTIVE
WBC # BLD: 10.3 K/UL (ref 3.5–11.3)

## 2022-04-13 PROCEDURE — 87340 HEPATITIS B SURFACE AG IA: CPT

## 2022-04-13 PROCEDURE — 86803 HEPATITIS C AB TEST: CPT

## 2022-04-13 PROCEDURE — 86850 RBC ANTIBODY SCREEN: CPT

## 2022-04-13 PROCEDURE — 36415 COLL VENOUS BLD VENIPUNCTURE: CPT

## 2022-04-13 PROCEDURE — 86787 VARICELLA-ZOSTER ANTIBODY: CPT

## 2022-04-13 PROCEDURE — 86901 BLOOD TYPING SEROLOGIC RH(D): CPT

## 2022-04-13 PROCEDURE — 87389 HIV-1 AG W/HIV-1&-2 AB AG IA: CPT

## 2022-04-13 PROCEDURE — 86762 RUBELLA ANTIBODY: CPT

## 2022-04-13 PROCEDURE — 85025 COMPLETE CBC W/AUTO DIFF WBC: CPT

## 2022-04-13 PROCEDURE — 86780 TREPONEMA PALLIDUM: CPT

## 2022-04-13 PROCEDURE — 86900 BLOOD TYPING SEROLOGIC ABO: CPT

## 2022-04-14 DIAGNOSIS — O99.019 ANEMIA, ANTEPARTUM: Primary | ICD-10-CM

## 2022-04-14 PROBLEM — Z67.91 RH NEGATIVE STATE IN ANTEPARTUM PERIOD: Status: ACTIVE | Noted: 2022-04-14

## 2022-04-14 PROBLEM — O26.899 RH NEGATIVE STATE IN ANTEPARTUM PERIOD: Status: ACTIVE | Noted: 2022-04-14

## 2022-04-14 RX ORDER — FERROUS SULFATE 325(65) MG
325 TABLET ORAL 2 TIMES DAILY
Qty: 60 TABLET | Refills: 5 | Status: SHIPPED | OUTPATIENT
Start: 2022-04-14 | End: 2022-10-09

## 2022-04-15 LAB — VZV IGG SER QL IA: 1.24

## 2022-04-19 ENCOUNTER — ROUTINE PRENATAL (OUTPATIENT)
Dept: OBGYN CLINIC | Age: 22
End: 2022-04-19
Payer: COMMERCIAL

## 2022-04-19 VITALS
WEIGHT: 139.8 LBS | SYSTOLIC BLOOD PRESSURE: 120 MMHG | HEART RATE: 87 BPM | BODY MASS INDEX: 19.5 KG/M2 | DIASTOLIC BLOOD PRESSURE: 61 MMHG

## 2022-04-19 DIAGNOSIS — Z67.91 RH NEGATIVE STATE IN ANTEPARTUM PERIOD: ICD-10-CM

## 2022-04-19 DIAGNOSIS — O26.899 RH NEGATIVE STATE IN ANTEPARTUM PERIOD: ICD-10-CM

## 2022-04-19 DIAGNOSIS — Z3A.16 16 WEEKS GESTATION OF PREGNANCY: ICD-10-CM

## 2022-04-19 DIAGNOSIS — R82.5 POSITIVE URINE DRUG SCREEN: ICD-10-CM

## 2022-04-19 DIAGNOSIS — O99.891 PREGNANCY WITH SUPRAPUBIC PAIN, ANTEPARTUM: ICD-10-CM

## 2022-04-19 DIAGNOSIS — O26.12: ICD-10-CM

## 2022-04-19 DIAGNOSIS — Z23 HIGH PRIORITY FOR COVID-19 VACCINATION: ICD-10-CM

## 2022-04-19 DIAGNOSIS — Z34.00 SUPERVISION OF NORMAL FIRST PREGNANCY, ANTEPARTUM: Primary | ICD-10-CM

## 2022-04-19 DIAGNOSIS — O99.019 ANEMIA, ANTEPARTUM: ICD-10-CM

## 2022-04-19 DIAGNOSIS — R10.30 PREGNANCY WITH SUPRAPUBIC PAIN, ANTEPARTUM: ICD-10-CM

## 2022-04-19 LAB
BILIRUBIN, POC: NORMAL
BLOOD URINE, POC: NORMAL
CLARITY, POC: CLEAR
COLOR, POC: YELLOW
GLUCOSE URINE, POC: NORMAL
KETONES, POC: NORMAL
LEUKOCYTE EST, POC: NORMAL
NITRITE, POC: NORMAL
PH, POC: 8.5
PROTEIN, POC: NORMAL
SPECIFIC GRAVITY, POC: 1.02
UROBILINOGEN, POC: 0.2

## 2022-04-19 PROCEDURE — 99214 OFFICE O/P EST MOD 30 MIN: CPT | Performed by: ADVANCED PRACTICE MIDWIFE

## 2022-04-19 PROCEDURE — 81003 URINALYSIS AUTO W/O SCOPE: CPT | Performed by: ADVANCED PRACTICE MIDWIFE

## 2022-04-19 NOTE — PROGRESS NOTES
SUBJECTIVE:  Neel Keller is here for her return OB visit. She denies feeling fetal movement. She denies vaginal bleeding. She denies vaginal discharge. She denies leaking of fluid. She denies uterine cramping. She denies nausea and/or vomiting. Neel Keller reports she does not have an appetite so she is not eating much, has lost about 5 pounds. No vomiting. States using THC for appetite. Also notices when her bladder is full it hurts, suspects ligaments but would like checked for UTI. OBJECTIVE:  Blood pressure 120/61, pulse 87, weight 139 lb 12.8 oz (63.4 kg), last menstrual period 12/27/2021, not currently breastfeeding. TWG: -5 lb 3.2 oz (-2.359 kg)    Neel Keller has not received the COVID-19 vaccine, declines   Rh negative - will need Rhogam at 28 weeks    ASSESSMENT/PLAN:  [facility] at [facility] will watch for fetal movement.  EDC was established.  Labs were reviewed.  Declined genetics   Carrier screening: drawn today    Single sMAFP was not ordered for the 16-20 wk gestational window, declined   Weight gain guidelines was reviewed, see below  Normal: BMI < 25: Gain 25-35 lb   Reviewed warning signs: cramping, acute abdominal pain, dysuria, fever/chills, abnormal vaginal discharge or leaking fluid, or vaginal bleeding.  [facility] referral in place for anatomy scan at 18-22 weeks.    S =D  Supervision of normal first pregnancy, antepartum  · Ambulatory referral to Maternal Fetal for anatomy sca  Rh negative state in antepartum period  · Reviewed Rhogam at 28 weeks  Anemia, antepartum  · H/H 10.3 with supplementation - rpt at 28w  · Taking iron as prescribed  Positive urine drug screen  · + [facility] 3/3/22    · Reviewed recommendation for cessation   Pregnancy with suprapubic pain, antepartum  · POCT Urinalysis No Micro (Auto)- negative  · Reviewed likely ligaments stretching when bladder is full, no other s/s reported  Low weight gain in pregnancy in second trimester  · Reviewed calorie dense foods to aid in weight gain. Reviewed appetite may increase as time goes by, focus on protein and micronutrients   DECLINES COVID-19 vaccine       The problem list was reviewed and updated with any new issues. Return in about 4 weeks (around 5/17/2022) for OB visit with Midwives. The patient, Jessica Simmons,  was seen with a total time spent of 30 minutes for the visit on this date of service by the [facility]  The time component involved both face-to-face (counseling and education) and non face-to-face time (care coordination), spent in determining the total time component.       Electronically signed by: YADIRA Fernandez CNM

## 2022-05-10 ENCOUNTER — TELEPHONE (OUTPATIENT)
Dept: OBGYN CLINIC | Age: 22
End: 2022-05-10

## 2022-05-10 DIAGNOSIS — D56.3 ALPHA THALASSEMIA SILENT CARRIER: Primary | ICD-10-CM

## 2022-05-10 DIAGNOSIS — Z14.8 CARRIER OF HEMOGLOBINOPATHY DISORDER: ICD-10-CM

## 2022-05-10 DIAGNOSIS — Z34.00 SUPERVISION OF NORMAL FIRST PREGNANCY, ANTEPARTUM: ICD-10-CM

## 2022-05-10 NOTE — TELEPHONE ENCOUNTER
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 Reviewed I will place [facility] referral so she can have a consultation scheduled, and her results should be available though [employer] login (if cannot get access can discuss at upcoming visit). Reviewed next steps would be partner testing, so either [facility] will order or we can order free partner testing through [employer] at her [address]. Jessica Ellis understanding.

## 2022-05-18 ENCOUNTER — ROUTINE PRENATAL (OUTPATIENT)
Dept: PERINATAL CARE | Age: 22
End: 2022-05-18
Payer: COMMERCIAL

## 2022-05-18 VITALS
TEMPERATURE: 98.2 F | WEIGHT: 144 LBS | DIASTOLIC BLOOD PRESSURE: 69 MMHG | HEIGHT: 71 IN | RESPIRATION RATE: 18 BRPM | HEART RATE: 92 BPM | BODY MASS INDEX: 20.16 KG/M2 | SYSTOLIC BLOOD PRESSURE: 119 MMHG

## 2022-05-18 DIAGNOSIS — O26.12 LOW WEIGHT GAIN DURING PREGNANCY IN SECOND TRIMESTER: ICD-10-CM

## 2022-05-18 DIAGNOSIS — D56.3 ALPHA THALASSEMIA SILENT CARRIER: Primary | ICD-10-CM

## 2022-05-18 DIAGNOSIS — Z3A.20 20 WEEKS GESTATION OF PREGNANCY: ICD-10-CM

## 2022-05-18 DIAGNOSIS — O28.5 ABNORMAL GENETIC TEST DURING PREGNANCY: Primary | ICD-10-CM

## 2022-05-18 DIAGNOSIS — Z36.86 ENCOUNTER FOR SCREENING FOR RISK OF PRE-TERM LABOR: ICD-10-CM

## 2022-05-18 LAB
ABDOMINAL CIRCUMFERENCE: NORMAL
ABDOMINAL CIRCUMFERENCE: NORMAL
BIPARIETAL DIAMETER: NORMAL
BIPARIETAL DIAMETER: NORMAL
ESTIMATED FETAL WEIGHT: NORMAL
ESTIMATED FETAL WEIGHT: NORMAL
FEMORAL DIAMETER: NORMAL
FEMORAL DIAMETER: NORMAL
HC/AC: NORMAL
HC/AC: NORMAL
HEAD CIRCUMFERENCE: NORMAL
HEAD CIRCUMFERENCE: NORMAL

## 2022-05-18 PROCEDURE — 76817 TRANSVAGINAL US OBSTETRIC: CPT | Performed by: OBSTETRICS & GYNECOLOGY

## 2022-05-18 PROCEDURE — 76811 OB US DETAILED SNGL FETUS: CPT | Performed by: OBSTETRICS & GYNECOLOGY

## 2022-05-18 PROCEDURE — 99243 OFF/OP CNSLTJ NEW/EST LOW 30: CPT | Performed by: OBSTETRICS & GYNECOLOGY

## 2022-05-18 NOTE — PROGRESS NOTES
SUBJECTIVE:  Caitie Rodriguez is here for her return OB visit. She is doing well and offers no complaints today  She reports fetal movement. She denies vaginal bleeding. She denies vaginal discharge. She denies leaking of fluid. She denies uterine contraction activity. She denies nausea and/or vomiting. She denies retaining fluid in her extremities. OBJECTIVE:  Blood pressure (!) 110/58, pulse 78, weight 145 lb 1 oz (65.8 kg), last menstrual period 12/27/2021, not currently breastfeeding. ASSESSMENT/PLAN:  1. 20 weeks gestation of pregnancy  S=D    2. High-risk pregnancy in second trimester  The problem list was reviewed and updated as needed with any new issues today    Caitie Rodriguez will begin to monitor fetal movement daily    Labs were not ordered. BMI and appropriate weight gain recommendations were discussed. Reviewed no weight gain to date (lost and is back to pre-pregnancy weight). Advised to consider Protein drinks/bars  Normal: BMI < 25: Gain 25-35 lb  Reviewed Glucola for NOV    3. Positive urine drug screen  - Discussed positive UDS, states has stopped but wait until next to screen or at 36 weeks    4. Alpha thalassemia carrier  - Relates was at [facility] for [facility]/consult and Orie Both has been drawn    5. Rh negative state in antepartum period  - Aware will need RhoGam at 2nd timester labs    6. Anemia, antepartum  - Is taking iron, await CBC    7. Poor weight gain of pregnancy, second trimester   - Reviewed diet, appears adequate; to consider protein drinks/bars  - Watch weight gain Anabella Wharton was counseled regarding all of the above      The patient, Nena Hernandez,  was seen with a total time spent of 30 minutes for the visit on this date of service by the [facility]  The time component involved both face-to-face (counseling and education) and non face-to-face time (care coordination), spent in determining the total time component.

## 2022-05-19 ENCOUNTER — ROUTINE PRENATAL (OUTPATIENT)
Dept: OBGYN CLINIC | Age: 22
End: 2022-05-19
Payer: COMMERCIAL

## 2022-05-19 VITALS
SYSTOLIC BLOOD PRESSURE: 110 MMHG | DIASTOLIC BLOOD PRESSURE: 58 MMHG | HEART RATE: 78 BPM | WEIGHT: 145.06 LBS | BODY MASS INDEX: 20.23 KG/M2

## 2022-05-19 DIAGNOSIS — O26.12 POOR WEIGHT GAIN OF PREGNANCY, SECOND TRIMESTER: ICD-10-CM

## 2022-05-19 DIAGNOSIS — D56.3 ALPHA THALASSEMIA SILENT CARRIER: ICD-10-CM

## 2022-05-19 DIAGNOSIS — O09.92 HIGH-RISK PREGNANCY IN SECOND TRIMESTER: ICD-10-CM

## 2022-05-19 DIAGNOSIS — O26.899 RH NEGATIVE STATE IN ANTEPARTUM PERIOD: ICD-10-CM

## 2022-05-19 DIAGNOSIS — Z3A.20 20 WEEKS GESTATION OF PREGNANCY: Primary | ICD-10-CM

## 2022-05-19 DIAGNOSIS — Z67.91 RH NEGATIVE STATE IN ANTEPARTUM PERIOD: ICD-10-CM

## 2022-05-19 DIAGNOSIS — R82.5 POSITIVE URINE DRUG SCREEN: ICD-10-CM

## 2022-05-19 DIAGNOSIS — O99.019 ANEMIA, ANTEPARTUM: ICD-10-CM

## 2022-05-19 PROCEDURE — 99214 OFFICE O/P EST MOD 30 MIN: CPT | Performed by: ADVANCED PRACTICE MIDWIFE

## 2022-05-19 NOTE — PROGRESS NOTES
Pt is here today at her 20w3 prenatal visit  Pt states fetal movement is present  Pt has no questions or concerns

## 2022-06-15 NOTE — PROGRESS NOTES
SUBJECTIVE:  Ana Luisa Byers is here for her return OB visit. She is doing well, with no complaints offered today  Has added protein drinks to her diet to help with weight  She reports fetal movement. She denies vaginal bleeding. She denies vaginal discharge. She denies leaking of fluid. She denies uterine contraction activity. She denies nausea and/or vomiting. She denies retaining fluid in her extremities. OBJECTIVE:  Blood pressure (!) 110/49, pulse 87, weight 150 lb (68 kg), last menstrual period 12/27/2021, not currently breastfeeding. Ana Luisa Byers has not received the Tdap vaccine as appropriate    Belden score: 1      ASSESSMENT/PLAN:  1. 24 weeks gestation of pregnancy  S=D    2. Supervision of normal first pregnancy, antepartum  The problem list was reviewed and updated with any new issues from today's visit    Ana Luisa Byers will monitor fetal movement daily. 28 week lab panel was discussed and was drawn today  Initial discussion on Tdap in pregnancy was initiated and she declines today   2nd trimester packet was given today, continue birth plan discussions  - Glucose Tolerance, 1 Hr; Future  - CBC; Future  - Antibody Screen; Future    3. Rh negative state in antepartum period  RhoGam was discussed and she will return for injection at [facility]  - Glucose Tolerance, 1 Hr; Future  - CBC; Future  - Antibody Screen; Future    4. Poor weight gain of pregnancy, second trimester  - 5 lb weight gain total  - Reinforced continued protein drink intake      Altagracia was counseled regarding all of the above      - On this date June 16, 2022,  I have spent greater than 50% of this 30 minute visit reviewing previous notes, test results and face to face with the patient discussing the diagnoses, importance of compliance with the treatment plan, counseling, coordinating care as well as documenting on the day of the visit.

## 2022-06-16 ENCOUNTER — HOSPITAL ENCOUNTER (OUTPATIENT)
Age: 22
Setting detail: SPECIMEN
Discharge: HOME | End: 2022-06-16

## 2022-06-16 ENCOUNTER — ROUTINE PRENATAL (OUTPATIENT)
Dept: OBGYN CLINIC | Age: 22
End: 2022-06-16
Payer: COMMERCIAL

## 2022-06-16 VITALS
HEART RATE: 87 BPM | WEIGHT: 150 LBS | DIASTOLIC BLOOD PRESSURE: 49 MMHG | BODY MASS INDEX: 20.92 KG/M2 | SYSTOLIC BLOOD PRESSURE: 110 MMHG

## 2022-06-16 DIAGNOSIS — O26.12 POOR WEIGHT GAIN OF PREGNANCY, SECOND TRIMESTER: ICD-10-CM

## 2022-06-16 DIAGNOSIS — Z3A.24 24 WEEKS GESTATION OF PREGNANCY: ICD-10-CM

## 2022-06-16 DIAGNOSIS — Z28.21 TETANUS, DIPHTHERIA, AND ACELLULAR PERTUSSIS (TDAP) VACCINATION DECLINED: ICD-10-CM

## 2022-06-16 DIAGNOSIS — Z34.00 SUPERVISION OF NORMAL FIRST PREGNANCY, ANTEPARTUM: Primary | ICD-10-CM

## 2022-06-16 DIAGNOSIS — Z67.91 RH NEGATIVE STATE IN ANTEPARTUM PERIOD: ICD-10-CM

## 2022-06-16 DIAGNOSIS — O26.899 RH NEGATIVE STATE IN ANTEPARTUM PERIOD: ICD-10-CM

## 2022-06-16 PROCEDURE — 99214 OFFICE O/P EST MOD 30 MIN: CPT | Performed by: ADVANCED PRACTICE MIDWIFE

## 2022-06-16 NOTE — PROGRESS NOTES
Pt is here today at her 24w3 prenatal visit  Pt states fetal movement is present  Pt has no questions or concerns    EPDS- 1    50g glucola given to patient  Start-10:33a  [facility]- 10:34a  Draw- 11:34a    [facility]-[id number]  LOT- [id number]  EXP-11/30/23

## 2022-06-17 DIAGNOSIS — Z3A.24 24 WEEKS GESTATION OF PREGNANCY: ICD-10-CM

## 2022-06-17 DIAGNOSIS — Z67.91 RH NEGATIVE STATE IN ANTEPARTUM PERIOD: ICD-10-CM

## 2022-06-17 DIAGNOSIS — O26.899 RH NEGATIVE STATE IN ANTEPARTUM PERIOD: ICD-10-CM

## 2022-06-17 DIAGNOSIS — Z34.00 SUPERVISION OF NORMAL FIRST PREGNANCY, ANTEPARTUM: ICD-10-CM

## 2022-06-17 LAB
ANTIBODY SCREEN: NEGATIVE
GLUCOSE ADMINISTRATION: NORMAL
GLUCOSE TOLERANCE SCREEN 50G: 129 MG/DL (ref 70–135)
HCT VFR BLD CALC: 34.8 % (ref 36.3–47.1)
HEMOGLOBIN: 9.9 G/DL (ref 11.9–15.1)
MCH RBC QN AUTO: 27 PG (ref 25.2–33.5)
MCHC RBC AUTO-ENTMCNC: 28.4 G/DL (ref 28.4–34.8)
MCV RBC AUTO: 94.8 FL (ref 82.6–102.9)
NRBC AUTOMATED: 0 PER 100 WBC
PDW BLD-RTO: 14.6 % (ref 11.8–14.4)
PLATELET # BLD: 240 K/UL (ref 138–453)
PMV BLD AUTO: 11.2 FL (ref 8.1–13.5)
RBC # BLD: 3.67 M/UL (ref 3.95–5.11)
WBC # BLD: 13.5 K/UL (ref 3.5–11.3)

## 2022-07-01 ENCOUNTER — HOSPITAL ENCOUNTER (OUTPATIENT)
Age: 22
Discharge: HOME | End: 2022-07-01
Payer: COMMERCIAL

## 2022-07-01 ENCOUNTER — INITIAL CONSULT (OUTPATIENT)
Dept: ONCOLOGY | Age: 22
End: 2022-07-01
Payer: COMMERCIAL

## 2022-07-01 ENCOUNTER — TELEPHONE (OUTPATIENT)
Dept: ONCOLOGY | Age: 22
End: 2022-07-01

## 2022-07-01 VITALS
HEART RATE: 77 BPM | DIASTOLIC BLOOD PRESSURE: 67 MMHG | TEMPERATURE: 97.8 F | SYSTOLIC BLOOD PRESSURE: 105 MMHG | HEIGHT: 71 IN | BODY MASS INDEX: 21.85 KG/M2 | WEIGHT: 156.1 LBS

## 2022-07-01 DIAGNOSIS — D64.9 NORMOCYTIC ANEMIA: ICD-10-CM

## 2022-07-01 DIAGNOSIS — D64.9 NORMOCYTIC ANEMIA: Primary | ICD-10-CM

## 2022-07-01 PROCEDURE — 99244 OFF/OP CNSLTJ NEW/EST MOD 40: CPT | Performed by: INTERNAL MEDICINE

## 2022-07-01 PROCEDURE — 36415 COLL VENOUS BLD VENIPUNCTURE: CPT

## 2022-07-01 PROCEDURE — 99211 OFF/OP EST MAY X REQ PHY/QHP: CPT | Performed by: INTERNAL MEDICINE

## 2022-07-01 PROCEDURE — 83020 HEMOGLOBIN ELECTROPHORESIS: CPT

## 2022-07-01 NOTE — TELEPHONE ENCOUNTER
AVS from 7/1/22     Hemoglobinopathy evaluation  Call with results    Labs drawn today  Writer will follow lab results and get to md    PT was given AVS

## 2022-07-01 NOTE — PROGRESS NOTES
_           Ms. Joan Pacheco is a very pleasant 23 y.o. female with history of multiple co morbidities as listed. Patient is referred for evaluation of thalassemia and possible sickle cell during pregnancy. Currently patient is pregnant with 6-1/2 months. This is her first pregnancy. She has no complications during this pregnancy. Patient was notified that she is having thalassemia and possible sickle cell and she is referred for evaluation. Patient has no history of any hematologic disorders. No history of thalassemia or sickle cell anemia. She herself is not aware of any hematologic problems in the past.  She has no weakness or fatigue. No dizziness. No palpitation. No shortness of breath. Patient's recent labs showed iron deficiency secondary to her pregnancy. Lab values before pregnancy were absolutely normal.. PAST MEDICAL HISTORY: has a past medical history of Irregular menses. PAST SURGICAL HISTORY: has a past surgical history that includes Tonsillectomy and adenoidectomy (age 1); intrauterine device insertion (12/24/2019); and Intrauterine Device Removal (10/26/2020). CURRENT MEDICATIONS:  has a current medication list which includes the following prescription(s): ferrous sulfate and prenatal vitamin. ALLERGIES:  has No Known Allergies. FAMILY HISTORY: Grandmother had lung cancer. Otherwise negative for any hematological or oncological conditions. SOCIAL HISTORY:  reports that she has never smoked. She has never used smokeless tobacco. She reports previous alcohol use. She reports that she does not use drugs. REVIEW OF SYSTEMS:     · General: Positive for weakness and fatigue. No unanticipated weight loss or decreased appetite. No fever or chills. · Eyes: No blurred vision, eye pain or double vision. · Ears: No hearing problems or drainage. No tinnitus.    · Throat: No sore throat, problems with swallowing or dysphagia. · Respiratory: No cough, sputum or hemoptysis. No shortness of breath. No pleuritic chest pain. · Cardiovascular: No chest pain, orthopnea or PND. No lower extremity edema. No palpitation. · Gastrointestinal: No problems with swallowing. No abdominal pain or bloating. No nausea or vomiting. No diarrhea or constipation. No GI bleeding. · Genitourinary: No dysuria, hematuria, frequency or urgency. · Musculoskeletal: No muscle aches or pains. No limitation of movement. No back pain. No gait disturbance, No joint complaints. · Dermatologic: No skin rashes or pruritus. No skin lesions or discolorations. · Psychiatric: No depression, anxiety, or stress or signs of schizophrenia. No change in mood or affect. · Hematologic: No history of bleeding tendency. No bruises or ecchymosis. No history of clotting problems. · Infectious disease: No fever, chills or frequent infections. · Endocrine: No polydipsia or polyuria. No temperature intolerance. · Neurologic: No headaches or dizziness. No weakness or numbness of the extremities. No changes in balance, coordination,  memory, mentation, behavior. · Allergic/Immunologic: No nasal congestion or hives. No repeated infections. PHYSICAL EXAM:  The patient is not in acute distress. Vital signs: Blood pressure 105/67, pulse 77, temperature 97.8 °F (36.6 °C), temperature source Temporal, height 5' 11\" (1.803 m), weight 156 lb 1.6 oz (70.8 kg), last menstrual period 12/27/2021, not currently breastfeeding.      General appearance - well appearing, not in pain or distress  Mental status - good mood, alert and oriented  Eyes - pupils equal and reactive, extraocular eye movements intact  Ears - bilateral TM's and external ear canals normal  Nose - normal and patent, no erythema, discharge or polyps  Mouth - mucous membranes moist, pharynx normal without lesions  Neck - supple, no significant adenopathy  Lymphatics - no palpable lymphadenopathy, no hepatosplenomegaly  Chest - clear to auscultation, no wheezes, rales or rhonchi, symmetric air entry  Heart - normal rate, regular rhythm, normal S1, S2, no murmurs, rubs, clicks or gallops  Abdomen -6.5 months pregnant. Soft, nontender, nondistended, no masses or organomegaly  Neurological - alert, oriented, normal speech, no focal findings or movement disorder noted  Musculoskeletal - no joint tenderness, deformity or swelling  Extremities - peripheral pulses normal, no pedal edema, no clubbing or cyanosis  Skin - normal coloration and turgor, no rashes, no suspicious skin lesions noted     Review of Diagnostic data:   Lab Results   Component Value Date    WBC 13.5 (H) 06/16/2022    HGB 9.9 (L) 06/16/2022    HCT 34.8 (L) 06/16/2022    MCV 94.8 06/16/2022     06/16/2022       Chemistry    No results found for: NA, K, CL, CO2, BUN, CREATININE, GLU No results found for: CALCIUM, ALKPHOS, AST, ALT, BILITOT           IMPRESSION:   Pregnancy. 6.5 months gestation  Normocytic anemia  Iron deficiency anemia secondary to pregnancy with increased demand    PLAN: Records, labs and images were reviewed and discussed with the patient. I explained to the patient the nature of these abnormalities and the recent labs. Labs are consistent with anemia during pregnancy which is related to increased demand. It is probably secondary to iron deficiency and folic acid deficiency. Patient was started on prenatal vitamins. She is improving. She does not have any symptoms at the present time. Prenatal vitamins will be continued. Patient gives history of possible thalassemia or sickle cell. I do not see any previous test showing these abnormalities. Patient's lab values before being pregnant were absolutely normal.  I explained to the patient that patients with thalassemia usually presents with severe microcytosis with increased red blood cells.   Furthermore patient has no family history of such abnormalities. But to rule out that I will do hemoglobinopathy evaluation (hemoglobin electrophoresis ) and we will contact him with the results. Patient's questions were answered to the best of her satisfaction and she verbalized full understanding and agreement.

## 2022-07-08 LAB
HGB ELECTROPHORESIS INTERP: NORMAL
PATHOLOGIST: NORMAL

## 2022-07-14 ENCOUNTER — ROUTINE PRENATAL (OUTPATIENT)
Dept: PERINATAL CARE | Age: 22
End: 2022-07-14
Payer: COMMERCIAL

## 2022-07-14 VITALS
DIASTOLIC BLOOD PRESSURE: 77 MMHG | HEIGHT: 71 IN | HEART RATE: 85 BPM | BODY MASS INDEX: 21.98 KG/M2 | TEMPERATURE: 97.2 F | RESPIRATION RATE: 18 BRPM | SYSTOLIC BLOOD PRESSURE: 129 MMHG | WEIGHT: 157 LBS

## 2022-07-14 DIAGNOSIS — O26.13 LOW WEIGHT GAIN DURING PREGNANCY IN THIRD TRIMESTER: ICD-10-CM

## 2022-07-14 DIAGNOSIS — Z3A.28 28 WEEKS GESTATION OF PREGNANCY: ICD-10-CM

## 2022-07-14 DIAGNOSIS — Z36.4 ANTENATAL SCREENING FOR FETAL GROWTH RETARDATION USING ULTRASONICS: ICD-10-CM

## 2022-07-14 DIAGNOSIS — Z03.71 SUSPECTED PROBLEM WITH AMNIOTIC CAVITY AND MEMBRANE NOT FOUND: ICD-10-CM

## 2022-07-14 DIAGNOSIS — O28.5 ABNORMAL GENETIC TEST DURING PREGNANCY: ICD-10-CM

## 2022-07-14 DIAGNOSIS — Z13.89 ENCOUNTER FOR ROUTINE SCREENING FOR MALFORMATION USING ULTRASONICS: ICD-10-CM

## 2022-07-14 LAB
ABDOMINAL CIRCUMFERENCE: NORMAL
BIPARIETAL DIAMETER: NORMAL
ESTIMATED FETAL WEIGHT: NORMAL
FEMORAL DIAMETER: NORMAL
HC/AC: NORMAL
HEAD CIRCUMFERENCE: NORMAL

## 2022-07-14 PROCEDURE — 76805 OB US >/= 14 WKS SNGL FETUS: CPT | Performed by: OBSTETRICS & GYNECOLOGY

## 2022-07-14 PROCEDURE — 99999 PR OFFICE/OUTPT VISIT,PROCEDURE ONLY: CPT | Performed by: OBSTETRICS & GYNECOLOGY

## 2022-07-14 PROCEDURE — 76819 FETAL BIOPHYS PROFIL W/O NST: CPT | Performed by: OBSTETRICS & GYNECOLOGY

## 2022-07-14 PROCEDURE — 76817 TRANSVAGINAL US OBSTETRIC: CPT | Performed by: OBSTETRICS & GYNECOLOGY

## 2022-07-15 NOTE — PROGRESS NOTES
SUBJECTIVE:    Tersea Conley is here for her return OB visit. denies concerns today. She is feeling well today. She reports  feeling fetal movement. She denies vaginal bleeding. She denies vaginal discharge. She denies leaking of fluid. She denies uterine cramping. She denies  nausea and/or vomiting. OBJECTIVE:  Blood pressure 105/68, pulse 84, weight 158 lb 6.4 oz (71.8 kg), last menstrual period 12/27/2021, not currently breastfeeding. Total weight gain: 13 lb 6.4 oz (6.078 kg)      Teresa Conley declined the Tdap vaccine as appropriate, patient still thinking about. Teresa Conley declined the COVID vaccine as appropriate    ASSESSMENT/PLAN:    1. Normal pregnancy in third trimester  IUP @ 29+0 weeks  S = D    2. 29 weeks gestation of pregnancy  Due date is based on LMP and confirmed with 9w5d early dating ultrasound  Patient's prenatal labs are completed. Patient's blood type A negative and rhogam is indicated in pregnancy. Patient declined genetic screening. Anatomy scan completed. Placenta posterior,normal cord insertion: Yes, cervical length 3.42 cm, no low lying, no previa noted. Follow up 28 weeks, completed 7/14/2022  Glucola between 24-28 weeks. complete 129 Pass  Total weight gain in pregnancy reviewed: Yes  Fetal Kick Count was discussed and explained. She was instructed to lay on her left side 20 minutes after eating and count movements for up to 2 hours with a target value of 10 movements. 3. Rh negative state in antepartum period  - Rho(D) immune globulin given today    4. Anemia, antepartum  - ferrous sulfate 325 twice daily    5. Positive urine drug screen  - Patient stopped taking per verbal report      She was counseled regarding all of the above:    Return in about 2 weeks (around 8/1/2022) for Return OB.     The patient, Radha Lyons,  was seen with a total time spent of 25 minutes for the visit on this date of service by the [facility]  On this date July 18, 2022,  I have spent greater than 50% of this visit:  [x] reviewing previous notes  [x] reviewing test results  [x] pre-charting  Discussed with patient:  [x] Importance of compliance with treatment plan  [x] Counseling  [x] Coordinating care  [x] Documenting     Electronically Signed By: YADIRA Cartagena CNM

## 2022-07-18 ENCOUNTER — ROUTINE PRENATAL (OUTPATIENT)
Dept: OBGYN CLINIC | Age: 22
End: 2022-07-18
Payer: COMMERCIAL

## 2022-07-18 VITALS
WEIGHT: 158.4 LBS | DIASTOLIC BLOOD PRESSURE: 68 MMHG | SYSTOLIC BLOOD PRESSURE: 105 MMHG | HEART RATE: 84 BPM | BODY MASS INDEX: 22.09 KG/M2

## 2022-07-18 DIAGNOSIS — Z29.13 NEED FOR RHOGAM DUE TO RH NEGATIVE MOTHER: ICD-10-CM

## 2022-07-18 DIAGNOSIS — Z67.91 RH NEGATIVE STATE IN ANTEPARTUM PERIOD: ICD-10-CM

## 2022-07-18 DIAGNOSIS — O26.899 RH NEGATIVE STATE IN ANTEPARTUM PERIOD: ICD-10-CM

## 2022-07-18 DIAGNOSIS — Z3A.29 29 WEEKS GESTATION OF PREGNANCY: ICD-10-CM

## 2022-07-18 DIAGNOSIS — R82.5 POSITIVE URINE DRUG SCREEN: ICD-10-CM

## 2022-07-18 DIAGNOSIS — O99.019 ANEMIA, ANTEPARTUM: ICD-10-CM

## 2022-07-18 DIAGNOSIS — Z34.93 NORMAL PREGNANCY IN THIRD TRIMESTER: Primary | ICD-10-CM

## 2022-07-18 PROCEDURE — 99213 OFFICE O/P EST LOW 20 MIN: CPT

## 2022-07-18 NOTE — PROGRESS NOTES
Pt is here today at her 29w0d  Pt states fetal movement is present  Pt has stated she has stopped smoking 07/06/2022    Small leuks in todays urine

## 2022-07-18 NOTE — PROGRESS NOTES
After obtaining consent, and per orders of  Shila Avila,[place] injection of Rhogam given in left upper quad. gluteus by Karina Nguyen MA.  Patient instructed to remain in clinic for 20 minutes afterwards, and to report any adverse reaction to me immediately

## 2022-07-25 ENCOUNTER — TELEPHONE (OUTPATIENT)
Dept: OBGYN CLINIC | Age: 22
End: 2022-07-25

## 2022-08-02 PROBLEM — Z80.49 FAMILY HISTORY OF UTERINE CANCER: Status: RESOLVED | Noted: 2019-11-27 | Resolved: 2022-08-02

## 2022-08-02 PROBLEM — N92.6 IRREGULAR MENSES: Status: RESOLVED | Noted: 2019-11-27 | Resolved: 2022-08-02

## 2022-08-02 NOTE — PROGRESS NOTES
[facility]  [address] [place] [address] [address].  Kallie Knight [address] [place] [address]  Dept: [phone]      Patient Name: Chirag Wolf  Patient : 2000  MRN #: [id number]  [facility] #: [id number]    Date: 8/3/2022  Time: 8:51 AM  Chief Complaint   Patient presents with    Routine Prenatal Visit     31w2d     Patient's last menstrual period was 2021. SUBJECTIVE:    Brittney Subramanian is here for her return OB visit. She is 31w1d weeks pregnant. She reports  feeling fetal movement. She denies vaginal bleeding, vaginal discharge, leaking of fluid. She denies uterine cramping. She denies  nausea and/or vomiting. She denies HA, visual changes, edema, or RUQ pain. No complaints today. Pt reports she has her baby shower in 2 weeks. She reports she received her Rhogam 22        OB History    Para Term  AB Living   1             SAB IAB Ectopic Molar Multiple Live Births                    # Outcome Date GA Lbr Greg/2nd Weight Sex Delivery Anes PTL Lv   1 Current              Past Medical History:   Diagnosis Date    Family history of uterine cancer 2019    MGMA- dx in 30s-40s MyRisk screening initiated 2019    Irregular menses      Past Surgical History:   Procedure Laterality Date    INTRAUTERINE DEVICE INSERTION  2019    Mirena. Lot [id number]  Exp 2020    INTRAUTERINE DEVICE REMOVAL  10/26/2020    TONSILLECTOMY AND ADENOIDECTOMY  age 1     Current Outpatient Medications   Medication Sig Dispense Refill    ferrous sulfate (IRON 325) 325 (65 Fe) MG tablet Take 1 tablet by mouth 2 times daily 60 tablet 5    Prenatal Vit-Fe Fumarate-FA (PRENATAL VITAMIN) 27-0.8 MG TABS Take 1 tablet by mouth daily 30 tablet 11     No current facility-administered medications for this visit.      No Known Allergies    OBJECTIVE:  /65   Pulse 84   Wt 164 lb 1.6 oz (74.4 kg)   LMP 2021   BMI 22.89 kg/m² Wt Readings from Last 3 Encounters:   22 164 lb 1.6 oz (74.4 kg)   22 158 lb 6.4 oz (71.8 kg)   22 157 lb (71.2 kg)     Body mass index is 22.89 kg/m². Total weight gain: 19 lb 1.6 oz (8.664 kg)    Andrew Scott has not received the flu vaccine as appropriate. Andrew Scott has not received the Tdap vaccine as appropriate  Andrew Scott has not received the COVID vaccine as appropriate    ASSESSMENT/PLAN:  IUP 31w1d weeks     Pregnancy  Due date is based on LMP and  confirmed with  early dating ultrasound  Patient's prenatal labs are  completed. Patient's A- and rhogam is  indicated in pregnancy. Gc/ct- neg/neg 3/3/22  urine culture- neg 3/3/22  UDS - +THC 3/3/22  Hep B- NR  Hep C- NR  HIV- NR  H/H/P: 10.3/32.2/263  Rubella- immune  T. Pallidum, IgG- NR  Varicella- immune  PAP- normal 3/22/22  Patient declined genetic screening. Anatomy scan WNL. Placenta posterior, normal cord insertion, cervical length 34mm, no low lying/previa noted. Follow up 28 week. Glucola 129 between 24-28 weeks. H/H/P: 9.9/34.8/240          MMW patient    Rhogam @28w:      Patient Active Problem List    Diagnosis Date Noted    Rh negative state in antepartum period 2022     Priority: High     Overview Note:     Discussed at [facility]: done  RhoGam @ 28 weeks:22      COVID-19 vaccination declined 2022     Priority: Medium    Tdap  vaccination declined 2022     Priority: Medium    Poor weight gain of pregnancy, second trimester 2022     Priority: Medium    Alpha thalassemia carrier 05/10/2022     Priority: Medium     Overview Note:     Alpha Thalassemia, HBA1/HBA2-related carrier  MFM referral sent 5/10/2022  Will need partner testing      Anemia, antepartum 2022     Priority: Medium     Overview Note:     H/H 10.3/32. 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     Overview Note:     HB Beta Chain-related Hemoglobinopathy (including Beta Thalassemia and Sickle Cell Disease) CARRIER  MFM referral sent 5/10/2022  Will need partner testing     () Hematology consult:      DECLINES COVID-19 vaccine  2022    Supervision of normal first pregnancy, antepartum 2022     Overview Note:     Genetic screening: declined   AFP: declined 2022   CF/SMA/FragileX: sent 2022   Chart Review W/S:         Hx of Chlamydia  infection () 2021     Overview Note:     2021 tx sent, TOR in 3 months: 2021 negative           Education    Breast pump rx sent  Reported she stopped THC use  Discussed  medications, circumcision, pediatrician and breastfeeding. The following were addressed during this visit:    28 Weeks  -  Labor Signs Review   - Fetal Growth and Movement   - Smoking Intervention Review        She was counseled regarding all of the above:    Return in about 2 weeks (around 2022) for Return OB. The patient, Chad Dutton was seen with a total time spent of 15 minutes for the visit on this date of service by the [facility]  Both face-to-face (counseling and education) and non face-to-face time (care coordination), were spent in determining the total time component.      Electronically Signed By: YADIRA Bernard CNM

## 2022-08-03 ENCOUNTER — HOSPITAL ENCOUNTER (OUTPATIENT)
Age: 22
Setting detail: SPECIMEN
Discharge: HOME | End: 2022-08-03

## 2022-08-03 ENCOUNTER — ROUTINE PRENATAL (OUTPATIENT)
Dept: OBGYN CLINIC | Age: 22
End: 2022-08-03
Payer: COMMERCIAL

## 2022-08-03 VITALS
BODY MASS INDEX: 22.89 KG/M2 | HEART RATE: 84 BPM | SYSTOLIC BLOOD PRESSURE: 112 MMHG | DIASTOLIC BLOOD PRESSURE: 65 MMHG | WEIGHT: 164.1 LBS

## 2022-08-03 DIAGNOSIS — R82.5 POSITIVE URINE DRUG SCREEN: ICD-10-CM

## 2022-08-03 DIAGNOSIS — Z3A.31 31 WEEKS GESTATION OF PREGNANCY: Primary | ICD-10-CM

## 2022-08-03 DIAGNOSIS — O26.899 RH NEGATIVE STATE IN ANTEPARTUM PERIOD: ICD-10-CM

## 2022-08-03 DIAGNOSIS — Z28.21 TETANUS, DIPHTHERIA, AND ACELLULAR PERTUSSIS (TDAP) VACCINATION DECLINED: ICD-10-CM

## 2022-08-03 DIAGNOSIS — O99.019 ANEMIA, ANTEPARTUM: ICD-10-CM

## 2022-08-03 DIAGNOSIS — Z3A.31 31 WEEKS GESTATION OF PREGNANCY: ICD-10-CM

## 2022-08-03 DIAGNOSIS — Z67.91 RH NEGATIVE STATE IN ANTEPARTUM PERIOD: ICD-10-CM

## 2022-08-03 DIAGNOSIS — Z34.93 NORMAL PREGNANCY IN THIRD TRIMESTER: ICD-10-CM

## 2022-08-03 DIAGNOSIS — Z28.21 COVID-19 VACCINATION DECLINED: ICD-10-CM

## 2022-08-03 PROCEDURE — 99214 OFFICE O/P EST MOD 30 MIN: CPT | Performed by: ADVANCED PRACTICE MIDWIFE

## 2022-08-04 LAB
AMPHETAMINE SCREEN URINE: NEGATIVE
BARBITURATE SCREEN URINE: NEGATIVE
BENZODIAZEPINE SCREEN, URINE: NEGATIVE
CANNABINOID SCREEN URINE: NEGATIVE
COCAINE METABOLITE, URINE: NEGATIVE
FENTANYL URINE: NEGATIVE
METHADONE SCREEN, URINE: NEGATIVE
OPIATES, URINE: NEGATIVE
OXYCODONE SCREEN URINE: NEGATIVE
PHENCYCLIDINE, URINE: NEGATIVE
TEST INFORMATION: NORMAL

## 2022-08-18 ENCOUNTER — HOSPITAL ENCOUNTER (OUTPATIENT)
Age: 22
Setting detail: SPECIMEN
Discharge: HOME | End: 2022-08-18

## 2022-08-18 ENCOUNTER — ROUTINE PRENATAL (OUTPATIENT)
Dept: OBGYN CLINIC | Age: 22
End: 2022-08-18
Payer: COMMERCIAL

## 2022-08-18 VITALS
BODY MASS INDEX: 23.42 KG/M2 | SYSTOLIC BLOOD PRESSURE: 124 MMHG | HEART RATE: 94 BPM | WEIGHT: 167.9 LBS | DIASTOLIC BLOOD PRESSURE: 70 MMHG

## 2022-08-18 DIAGNOSIS — Z14.8 CARRIER OF HEMOGLOBINOPATHY DISORDER: ICD-10-CM

## 2022-08-18 DIAGNOSIS — Z3A.33 33 WEEKS GESTATION OF PREGNANCY: ICD-10-CM

## 2022-08-18 DIAGNOSIS — Z23 NEED FOR DIPHTHERIA-TETANUS-PERTUSSIS (TDAP) VACCINE: ICD-10-CM

## 2022-08-18 DIAGNOSIS — O26.899 RH NEGATIVE STATE IN ANTEPARTUM PERIOD: ICD-10-CM

## 2022-08-18 DIAGNOSIS — D56.3 ALPHA THALASSEMIA SILENT CARRIER: ICD-10-CM

## 2022-08-18 DIAGNOSIS — Z34.00 SUPERVISION OF NORMAL FIRST PREGNANCY, ANTEPARTUM: Primary | ICD-10-CM

## 2022-08-18 DIAGNOSIS — Z67.91 RH NEGATIVE STATE IN ANTEPARTUM PERIOD: ICD-10-CM

## 2022-08-18 DIAGNOSIS — O99.019 ANEMIA, ANTEPARTUM: ICD-10-CM

## 2022-08-18 PROCEDURE — 90715 TDAP VACCINE 7 YRS/> IM: CPT | Performed by: ADVANCED PRACTICE MIDWIFE

## 2022-08-18 PROCEDURE — 99212 OFFICE O/P EST SF 10 MIN: CPT | Performed by: ADVANCED PRACTICE MIDWIFE

## 2022-08-18 PROCEDURE — 90471 IMMUNIZATION ADMIN: CPT | Performed by: ADVANCED PRACTICE MIDWIFE

## 2022-08-18 NOTE — PROGRESS NOTES
After obtaining consent, and per orders of  allie marti,CNM injection of tdap given in Left deltoid by Jose J Salinas MA. Patient instructed to remain in clinic for 20 minutes afterwards, and to report any adverse reaction to me immediately.

## 2022-08-18 NOTE — PROGRESS NOTES
Pt is here today at her 33w3d appointment  Pt states fetal movement is present  Pt has no complaints today.

## 2022-08-18 NOTE — PROGRESS NOTES
SUBJECTIVE:  London Avilez is here for her return OB visit. She reports fetal movement. She denies vaginal bleeding. She denies vaginal discharge. She denies leaking of fluid. She denies uterine contraction activity. She denies nausea and/or vomiting. She denies retaining fluid in her extremities. She denies headache, vision changes, RUQ pain, and epigastric pain. London Avilez has question about visitor policy at hospital.   Did not hear back from Dr. Arpan Ramos about her lab work. Has been taking iron as prescribed. OBJECTIVE:  Blood pressure 124/70, pulse 94, weight 167 lb 14.4 oz (76.2 kg), last menstrual period 2021, not currently breastfeeding. Pregravid BMI: 20.23   TW lb 14.4 oz (10.4 kg)    London Avilez has not received the Tdap vaccine as appropriate, received today   London Avilez has not received the COVID-19 vaccine, declines   Rhogam was given as indicated 22    ASSESSMENT/PLAN:  IUP @ 33w3d  Altagracia will monitor fetal movement daily. 28 week lab results were reviewed. Glucola 129, Hgb 9.9. Fetal Kick Counts reinforced. [facility] contractions vs  labor contractions were reviewed. Signs and symptoms of Pre-Eclampsia were reviewed and discussed. Initial discussion regarding birth plans was begun. S = D  Supervision of normal first pregnancy, antepartum  CBC with Auto Differential; Future  Chart sent to Dr. 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  He ordered electrophoresis  Electrophoresis: Elevated % HbA2 and other hematologic studies suggest beta thalassemia trait - Recommend pt call their office to follow up, did not hear back about these results  Anemia, antepartum  Last draw 9.9 - has been taking oral iron   CBC with Auto Differential; Future  Ferritin; Future  Iron and TIBC; Future  Rh negative state in antepartum period  Received Rhogam   Need for diphtheria-tetanus-pertussis (Tdap) vaccine  NH INJECTION,THERAP/PROPH/DIAGNOST, IM OR SUBCUT  Tetanus-Diphth-Acell Pertussis (BOOSTRIX) injection 0.5 mL  Vaccine Information Statement TDAP edition 2/24/15 given to patient on 2022  Encouraged close contacts of  to be up to date with Tdap vaccine. The problem list was reviewed and updated with any new issues from today's visit. After reviewing and updating the problem list, the chart was sent to Dr. Tulio Ladd for review    Return in about 2 weeks (around 2022) for OB visit with Midwives. The patient, Sal Arellano, was seen with a total time spent of 30 minutes for the visit on this date of service by the [facility]  The time component involved both face-to-face (counseling and education) and non face-to-face time (care coordination), spent in determining the total time component.       Electronically signed by: YADIRA Stearns CNM

## 2022-08-19 DIAGNOSIS — Z34.00 SUPERVISION OF NORMAL FIRST PREGNANCY, ANTEPARTUM: ICD-10-CM

## 2022-08-19 DIAGNOSIS — O99.019 ANEMIA, ANTEPARTUM: ICD-10-CM

## 2022-08-19 LAB
ABSOLUTE EOS #: 0.22 K/UL (ref 0–0.44)
ABSOLUTE IMMATURE GRANULOCYTE: 0.2 K/UL (ref 0–0.3)
ABSOLUTE LYMPH #: 2.39 K/UL (ref 1.1–3.7)
ABSOLUTE MONO #: 1.16 K/UL (ref 0.1–1.2)
BASOPHILS # BLD: 1 % (ref 0–2)
BASOPHILS ABSOLUTE: 0.09 K/UL (ref 0–0.2)
EOSINOPHILS RELATIVE PERCENT: 1 % (ref 1–4)
FERRITIN: 107 NG/ML (ref 13–150)
HCT VFR BLD CALC: 36.4 % (ref 36.3–47.1)
HEMOGLOBIN: 11.1 G/DL (ref 11.9–15.1)
IMMATURE GRANULOCYTES: 1 %
IRON SATURATION: 27 % (ref 20–55)
IRON: 130 UG/DL (ref 37–145)
LYMPHOCYTES # BLD: 14 % (ref 24–43)
MCH RBC QN AUTO: 27.8 PG (ref 25.2–33.5)
MCHC RBC AUTO-ENTMCNC: 30.5 G/DL (ref 28.4–34.8)
MCV RBC AUTO: 91.2 FL (ref 82.6–102.9)
MONOCYTES # BLD: 7 % (ref 3–12)
NRBC AUTOMATED: 0 PER 100 WBC
PDW BLD-RTO: 14.7 % (ref 11.8–14.4)
PLATELET # BLD: 217 K/UL (ref 138–453)
PMV BLD AUTO: 11.5 FL (ref 8.1–13.5)
RBC # BLD: 3.99 M/UL (ref 3.95–5.11)
RBC # BLD: ABNORMAL 10*6/UL
SEG NEUTROPHILS: 76 % (ref 36–65)
SEGMENTED NEUTROPHILS ABSOLUTE COUNT: 13.03 K/UL (ref 1.5–8.1)
TOTAL IRON BINDING CAPACITY: 480 UG/DL (ref 250–450)
UNSATURATED IRON BINDING CAPACITY: 350 UG/DL (ref 112–347)
WBC # BLD: 17.1 K/UL (ref 3.5–11.3)

## 2022-08-29 NOTE — PROGRESS NOTES
SUBJECTIVE:    Jordyn Lucio is here for her routine OB visit. She is doing well and offer no complaints today  She reports  fetal movement. She denies  vaginal bleeding. She denies  leaking of fluid. She denies  vaginal discharge. She denies  uterine contraction activity. She denies  nausea and/or vomiting. She denies retaining fluid in her extremities. OBJECTIVE:   Blood pressure 122/68, pulse 92, weight 175 lb 14.4 oz (79.8 kg), last menstrual period 12/27/2021, not currently breastfeeding. ASSESSMENT/PLAN:  1. 35 weeks gestation of pregnancy  S=D    2. Supervision of normal first pregnancy, antepartum  The problem list was reviewed and updated as needed. Jordyn Lucio will monitor for fetal movements daily    GBS protocol and testing was discussed. GBS culture to be obtained [facility]  Birth preferences were discussed with no updates  She has received Tdap    3. Rh negative state in antepartum period  Has received RhoGam    4. 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  Anemia, antepartum  (8/18) Hgb up to 11      Altagracia was counseled regarding all of the above    The patient, Ayala Adan,  was seen with a total time spent of 20 minutes for the visit on this date of service by the [facility]  On this date September 1, 2022,  I have spent greater than 50% of this visit:  [x] reviewing previous notes  [x] reviewing test results  [x] pre-charting  Discussed with patient:  [x] Importance of compliance with treatment plan  [x] Counseling  [x] Coordinating care  [x] Documenting

## 2022-09-01 ENCOUNTER — ROUTINE PRENATAL (OUTPATIENT)
Dept: OBGYN CLINIC | Age: 22
End: 2022-09-01
Payer: COMMERCIAL

## 2022-09-01 VITALS
DIASTOLIC BLOOD PRESSURE: 68 MMHG | SYSTOLIC BLOOD PRESSURE: 122 MMHG | WEIGHT: 175.9 LBS | BODY MASS INDEX: 24.53 KG/M2 | HEART RATE: 92 BPM

## 2022-09-01 DIAGNOSIS — Z67.91 RH NEGATIVE STATE IN ANTEPARTUM PERIOD: ICD-10-CM

## 2022-09-01 DIAGNOSIS — Z34.00 SUPERVISION OF NORMAL FIRST PREGNANCY, ANTEPARTUM: Primary | ICD-10-CM

## 2022-09-01 DIAGNOSIS — Z14.8 CARRIER OF HEMOGLOBINOPATHY DISORDER: ICD-10-CM

## 2022-09-01 DIAGNOSIS — O26.899 RH NEGATIVE STATE IN ANTEPARTUM PERIOD: ICD-10-CM

## 2022-09-01 DIAGNOSIS — Z3A.35 35 WEEKS GESTATION OF PREGNANCY: ICD-10-CM

## 2022-09-01 DIAGNOSIS — O99.019 ANEMIA, ANTEPARTUM: ICD-10-CM

## 2022-09-01 DIAGNOSIS — O26.12 POOR WEIGHT GAIN OF PREGNANCY, SECOND TRIMESTER: ICD-10-CM

## 2022-09-01 PROCEDURE — 99213 OFFICE O/P EST LOW 20 MIN: CPT | Performed by: ADVANCED PRACTICE MIDWIFE

## 2022-09-01 NOTE — PROGRESS NOTES
Pt is here today at her 35w3d  Pt states fetal movement is present  Pt has questions regarding carpal tunnel with little to no relief with braces

## 2022-09-08 ENCOUNTER — TELEPHONE (OUTPATIENT)
Dept: OBGYN CLINIC | Age: 22
End: 2022-09-08

## 2022-09-08 NOTE — TELEPHONE ENCOUNTER
Called patient if she called her hematologist's office yet?      She was supposed to follow up about her electrophoresis     [facility]

## 2022-09-08 NOTE — TELEPHONE ENCOUNTER
----- Message from YADIRA Ford CNM sent at 9/6/2022  7:20 AM EDT -----  Can you call Jaclyn Browne and see if she called her hematologist's office yet? She was supposed to follow up about her electrophoresis results.  Thanks

## 2022-09-09 ENCOUNTER — TELEPHONE (OUTPATIENT)
Dept: INFUSION THERAPY | Age: 22
End: 2022-09-09

## 2022-09-09 NOTE — TELEPHONE ENCOUNTER
Dr. Monica Matthews reviewed lab results; he states pt does have beta thalassemia trait. He states this is what is causing the anemia. He states there is nothing that she needs to do, and it will not have any effect on the pregnancy, but the baby should be tested at some point to see if they carry the trait. Pt notified.

## 2022-09-12 ENCOUNTER — HOSPITAL ENCOUNTER (OUTPATIENT)
Age: 22
Setting detail: SPECIMEN
Discharge: HOME | End: 2022-09-12

## 2022-09-12 ENCOUNTER — ROUTINE PRENATAL (OUTPATIENT)
Dept: OBGYN CLINIC | Age: 22
End: 2022-09-12
Payer: COMMERCIAL

## 2022-09-12 VITALS
HEART RATE: 93 BPM | SYSTOLIC BLOOD PRESSURE: 122 MMHG | WEIGHT: 181 LBS | BODY MASS INDEX: 25.24 KG/M2 | DIASTOLIC BLOOD PRESSURE: 68 MMHG

## 2022-09-12 DIAGNOSIS — Z67.91 RH NEGATIVE STATE IN ANTEPARTUM PERIOD: ICD-10-CM

## 2022-09-12 DIAGNOSIS — O26.899 RH NEGATIVE STATE IN ANTEPARTUM PERIOD: ICD-10-CM

## 2022-09-12 DIAGNOSIS — Z3A.37 37 WEEKS GESTATION OF PREGNANCY: ICD-10-CM

## 2022-09-12 DIAGNOSIS — Z3A.37 37 WEEKS GESTATION OF PREGNANCY: Primary | ICD-10-CM

## 2022-09-12 PROCEDURE — 99212 OFFICE O/P EST SF 10 MIN: CPT | Performed by: ADVANCED PRACTICE MIDWIFE

## 2022-09-12 NOTE — PROGRESS NOTES
Pt is here today at her 37w0d  Pt states fetal movement is present  Pt has concerns of swelling of her feet

## 2022-09-12 NOTE — PROGRESS NOTES
Last vitals:   Vitals:    09/06/19 0956   BP: 109/62   Pulse: 64   Resp: 18   Temp: 36.7  C (98  F)   SpO2: 100%     Patient's level of consciousness is drowsy  Spontaneous respirations: yes  Maintains airway independently: yes  Dentition unchanged: yes  Oropharynx: oropharynx clear of all foreign objects    QCDR Measures:  ASA# 20 - Surgical Safety Checklist: WHO surgical safety checklist completed prior to induction    PQRS# 430 - Adult PONV Prevention: 4558F - Pt received => 2 anti-emetic agents (different classes) preop & intraop  ASA# 8 - Peds PONV Prevention: NA - Not pediatric patient, not GA or 2 or more risk factors NOT present  PQRS# 424 - [place]-op Temp Management: 4559F - At least one body temp DOCUMENTED => 35.5C or 95.9F within required timeframe  PQRS# 426 - PACU Transfer Protocol: - Transfer of care checklist used  ASA# 14 - Acute Post-op Pain: ASA14B - Patient did NOT experience pain >= 7 out of 10   SUBJECTIVE:    Andrew Scott is here for her routine OB visit. She reports  fetal movement. She denies  vaginal bleeding. She denies  leaking of fluid. She denies  vaginal discharge. She denies  uterine contraction activity. She denies  nausea and/or vomiting. She reports retaining fluid in her ankles/feet. .  Ready for labor. GBS today. OBJECTIVE:   Blood pressure 122/68, pulse 93, weight 181 lb (82.1 kg), last menstrual period 12/27/2021, not currently breastfeeding. ASSESSMENT/PLAN:  1. Rh negative state in antepartum period      2. 37 weeks gestation of pregnancy    - Culture, Strep B Screen, Vaginal/Rectal; Future      The problem list was reviewed and updated as needed. Andrew Scott will monitor for fetal movements daily    GBS protocol and testing was discussed. GBS culture was obtained. Results were not reviewed. Signs of labor to report were discussed. Birth preferences were discussed. Post-dates testing and protocol were not discussed  Altagracia was not counseled regarding Post Partum Depression. She has not decided on contraceptive choice. Andrew Scott was counseled regarding all of the above    The patient, Melanie Sloan,  was seen with a total time spent of 20 minutes for the visit on this date of service by the [facility]  The time component, involved both face-to-face (counseling and education)  and non face-to-face time (care coordination), spent in determining the total time component.

## 2022-09-13 ENCOUNTER — TELEPHONE (OUTPATIENT)
Dept: ONCOLOGY | Age: 22
End: 2022-09-13

## 2022-09-13 ENCOUNTER — TELEPHONE (OUTPATIENT)
Dept: OBGYN CLINIC | Age: 22
End: 2022-09-13

## 2022-09-13 PROBLEM — Z14.8: Chronic | Status: ACTIVE | Noted: 2022-05-10

## 2022-09-13 NOTE — TELEPHONE ENCOUNTER
Lisa Boyd LPN from [facility] calling in regards to pt's carrier screen she had done and Hemoglobinopathy that was positive . [facility] following up to see if we are seeing pt in office or if you have a plan for pt .  Please advise     [phone] - [facility] office #    Electronically signed by Brenda Toledo MA on 9/13/2022 at 1:31 PM

## 2022-09-13 NOTE — TELEPHONE ENCOUNTER
Writer called Dr. Dave Avilez office and spoke with Nikita Beckford to let them know patient's abnormal carrier screen results have been uploaded into epic since it was not available at her appointment in July and to also f/u with patients hemoglobin electrophoresis as it came back suggestive of beta thalassemia trait. Nikita Beckford states she will send a message to Dr. Dave Avilez and let us know if patient needs any follow up.

## 2022-09-15 LAB
CULTURE: NORMAL
SPECIMEN DESCRIPTION: NORMAL

## 2022-09-19 NOTE — PROGRESS NOTES
[facility]  [address] [place] [address] [address]  [place] [address] [place] [address]  Dept: [phone]      Patient Name: Ken Hercules  Patient : 2000  MRN #: [id number]  [facility] #: [id number]    Date: 2022  Time: 12:52 PM  Chief Complaint   Patient presents with    Routine Prenatal Visit     38w1d     Patient's last menstrual period was 2021. SUBJECTIVE:    Celena Apley is here for her return OB visit. She is 38w0d weeks pregnant. She reports  feeling fetal movement. She denies vaginal bleeding, vaginal discharge, leaking of fluid. She denies uterine cramping. She denies  nausea and/or vomiting. She denies HA, visual changes, edema, or RUQ pain. OB History    Para Term  AB Living   1             SAB IAB Ectopic Molar Multiple Live Births                    # Outcome Date GA Lbr Greg/2nd Weight Sex Delivery Anes PTL Lv   1 Current              Past Medical History:   Diagnosis Date    Family history of uterine cancer 2019    MGMA- dx in 30s-40s MyRisk screening initiated 2019    Irregular menses      Past Surgical History:   Procedure Laterality Date    INTRAUTERINE DEVICE INSERTION  2019    Mirena. Lot [id number]  Exp 2020    INTRAUTERINE DEVICE REMOVAL  10/26/2020    TONSILLECTOMY AND ADENOIDECTOMY  age 1     Current Outpatient Medications   Medication Sig Dispense Refill    ferrous sulfate (IRON 325) 325 (65 Fe) MG tablet Take 1 tablet by mouth 2 times daily 60 tablet 5    Prenatal Vit-Fe Fumarate-FA (PRENATAL VITAMIN) 27-0.8 MG TABS Take 1 tablet by mouth daily 30 tablet 11     No current facility-administered medications for this visit.      No Known Allergies    OBJECTIVE:  /72   Pulse 90   Wt 181 lb 9.6 oz (82.4 kg)   LMP 2021   BMI 25.33 kg/m²   Wt Readings from Last 3 Encounters:   22 181 lb 9.6 oz (82.4 kg)   22 181 lb (82.1 kg)   22 175 lb 14.4 oz (79.8 kg)     Body mass index is 25.33 kg/m². Total weight gain: 36 lb 9.6 oz (16.6 kg)    Kieran Flores has not received the flu vaccine as appropriate. Kieran Flores has received the Tdap vaccine as appropriate  Kieran Flores has not received the COVID vaccine as appropriate    ASSESSMENT/PLAN:  IUP 38w0d weeks     Pregnancy  Due date is based on LMP and is confirmed with early dating ultrasound  Patient's prenatal labs are completed. Patient's A- and rhogam is indicated in pregnancy. Gc/ct- neg/neg 3/3/22  urine culture- neg 3/3/22  UDS - +THC 3/3/22  Hep B- NR  Hep C- NR  HIV- NR  H/H/P: 10.3/32.2/263  Rubella- immune  T. Pallidum, IgG- NR  Varicella- immune  PAP- normal 3/22/22  Patient declined genetic screening. Anatomy scan WNL. Placenta posterior, normal cord insertion, cervical length 34mm, no low lying/previa noted. Follow up 28 week. Glucola 129 between 24-28 weeks. H/H/P: 9.9/34.8/240    GBS- negative        MMW patient    Rhogam @28w: given 22     Patient Active Problem List    Diagnosis Date Noted    Rh negative state in antepartum period 2022     Priority: High     Overview Note:     D[facility]ed at [facility]: done  RhoGam @ 28 weeks:22      COVID-19 vaccination declined 2022     Priority: Medium    Tdap  vaccination RECEIVED 2022     Priority: Medium    Poor weight gain of pregnancy, second trimester: RESOLVED 2022     Priority: Medium    Alpha thalassemia carrier 05/10/2022     Priority: Medium     Overview Note:     Alpha Thalassemia, HBA1/HBA2-related carrier  MFM referral sent 5/10/2022  Will need partner testing: NEGATIVE      Anemia, antepartum 2022     Priority: Medium     Overview Note:     H/H 10.3/32. 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     Overview Note:     HB Beta Chain-related Hemoglobinopathy (including Beta Thalassemia and Sickle Cell Disease) CARRIER  MFM referral sent 5/10/2022  Will need partner testing: NEGATIVE    () Hematology consult: completed, did not review [facility] results in Media - ordered electrophoresis - elevated HbA2 and other hematologic studies suggest beta thalassemia trait - Impression: Heterozygous beta thalassemia (beta thalassemia trait) - 2022 recommended pt call hem office to follow up  () Reminded to call office   2022 pt states she attempted to reach them, will have our nurse contact the office to see if he has further recommendations   22 Hematology office contacted patient - confirms she does have beta thalassemia trait - no effects on pregnancy but baby should be tested at some point  (telephone encounter)      Supervision of normal first pregnancy, antepartum 2022     Overview Note:     Genetic screening: declined   AFP: declined 2022   CF/SMA/FragileX: sent 2022   Chart Review W/P: sent to Dr. Lindsay Mehta 2022   Reviewed by Sherrie Morgan DO on 22       Hx of Chlamydia  infection () 2021     Overview Note:     2021 tx sent, TOR in 3 months: 2021 negative         Education material given for Vit K, hep B, erythromycin eye ointment and  testing. Education    The following were addressed during this visit:    36 Weeks  - Postpartum Care   - Late Pregnancy Signs and Symptoms     38-41 Weeks  - Post-Term Management      Pt was educated on s/s labor and FM monitoring. Pt was instructed to call your provider if:    You have any signs or symptoms that are not normal.  You are thinking of taking any new medicines, vitamins, or herbs. You have any bleeding.   You have increased vaginal discharge with odor. You have a fever, chills, or pain when passing urine. You have headaches. You have changes or blind spots in your eyesight. Your water breaks. You start having regular, painful contractions q5 min, lasting 1 hr  You notice a decrease in fetal movement. You have significant swelling and weight gain. You have chest pain or difficulty breathing. Discussed breast pump prescription. Post-term and post date testing discussed    She was counseled regarding all of the above:    Return in about 1 week (around 9/27/2022) for Return OB. The patient, Meir Cabrales was seen with a total time spent of 15 minutes for the visit on this date of service by the [facility]  Both face-to-face (counseling and education) and non face-to-face time (care coordination), were spent in determining the total time component.      Electronically Signed By: Cesar Blizzard, APRN - CNM

## 2022-09-20 ENCOUNTER — ROUTINE PRENATAL (OUTPATIENT)
Dept: OBGYN CLINIC | Age: 22
End: 2022-09-20
Payer: COMMERCIAL

## 2022-09-20 VITALS
BODY MASS INDEX: 25.33 KG/M2 | DIASTOLIC BLOOD PRESSURE: 72 MMHG | WEIGHT: 181.6 LBS | SYSTOLIC BLOOD PRESSURE: 118 MMHG | HEART RATE: 90 BPM

## 2022-09-20 DIAGNOSIS — O26.899 RH NEGATIVE STATE IN ANTEPARTUM PERIOD: ICD-10-CM

## 2022-09-20 DIAGNOSIS — Z14.8 CARRIER OF HEMOGLOBINOPATHY DISORDER: Chronic | ICD-10-CM

## 2022-09-20 DIAGNOSIS — Z28.21 TETANUS, DIPHTHERIA, AND ACELLULAR PERTUSSIS (TDAP) VACCINATION DECLINED: ICD-10-CM

## 2022-09-20 DIAGNOSIS — R82.5 POSITIVE URINE DRUG SCREEN: ICD-10-CM

## 2022-09-20 DIAGNOSIS — Z34.00 SUPERVISION OF NORMAL FIRST PREGNANCY, ANTEPARTUM: ICD-10-CM

## 2022-09-20 DIAGNOSIS — Z3A.38 38 WEEKS GESTATION OF PREGNANCY: Primary | ICD-10-CM

## 2022-09-20 DIAGNOSIS — Z67.91 RH NEGATIVE STATE IN ANTEPARTUM PERIOD: ICD-10-CM

## 2022-09-20 DIAGNOSIS — O99.019 ANEMIA, ANTEPARTUM: ICD-10-CM

## 2022-09-20 PROCEDURE — 99214 OFFICE O/P EST MOD 30 MIN: CPT | Performed by: ADVANCED PRACTICE MIDWIFE

## 2022-09-20 NOTE — PROGRESS NOTES
Pt is here today at her 38w1d  Pt states fetal movement is present  Pt has questions regarding vaccines baby will receive after birth

## 2022-09-28 ENCOUNTER — ROUTINE PRENATAL (OUTPATIENT)
Dept: OBGYN CLINIC | Age: 22
End: 2022-09-28
Payer: COMMERCIAL

## 2022-09-28 VITALS
SYSTOLIC BLOOD PRESSURE: 110 MMHG | BODY MASS INDEX: 25.84 KG/M2 | HEART RATE: 83 BPM | DIASTOLIC BLOOD PRESSURE: 70 MMHG | WEIGHT: 185.3 LBS

## 2022-09-28 DIAGNOSIS — Z67.91 RH NEGATIVE STATE IN ANTEPARTUM PERIOD: ICD-10-CM

## 2022-09-28 DIAGNOSIS — Z3A.39 39 WEEKS GESTATION OF PREGNANCY: Primary | ICD-10-CM

## 2022-09-28 DIAGNOSIS — O99.019 ANEMIA, ANTEPARTUM: ICD-10-CM

## 2022-09-28 DIAGNOSIS — O26.899 RH NEGATIVE STATE IN ANTEPARTUM PERIOD: ICD-10-CM

## 2022-09-28 PROCEDURE — 99212 OFFICE O/P EST SF 10 MIN: CPT | Performed by: ADVANCED PRACTICE MIDWIFE

## 2022-09-28 NOTE — PROGRESS NOTES
SUBJECTIVE:    Collin Chua is here for her routine OB visit. She reports  fetal movement. She denies  vaginal bleeding. She denies  leaking of fluid. She denies  vaginal discharge. She denies  uterine contraction activity. She denies  nausea and/or vomiting. She denies retaining fluid in her extremities. OBJECTIVE:   Blood pressure 110/70, pulse 83, weight 185 lb 4.8 oz (84.1 kg), last menstrual period 12/27/2021, not currently breastfeeding. ASSESSMENT/PLAN:  1. Rh negative state in antepartum period      2. Anemia, antepartum      3. 39 weeks gestation of pregnancy        The problem list was reviewed and updated as needed. Collin Chua will monitor for fetal movements daily    GBS protocol and testing was discussed. GBS culture was obtained. Results were reviewed. Signs of labor to report were discussed. Birth preferences were discussed. Post-dates testing and protocol were discussed  Collin Chua was not counseled regarding Post Partum Depression. She has not decided on contraceptive choice. Collin Chua was counseled regarding all of the above    The patient, Obinna Imus,  was seen with a total time spent of 20 minutes for the visit on this date of service by the [facility]  The time component, involved both face-to-face (counseling and education)  and non face-to-face time (care coordination), spent in determining the total time component.

## 2022-10-05 ENCOUNTER — ROUTINE PRENATAL (OUTPATIENT)
Dept: OBGYN CLINIC | Age: 22
End: 2022-10-05
Payer: MEDICAID

## 2022-10-05 VITALS
WEIGHT: 186.9 LBS | DIASTOLIC BLOOD PRESSURE: 70 MMHG | BODY MASS INDEX: 26.07 KG/M2 | SYSTOLIC BLOOD PRESSURE: 114 MMHG | HEART RATE: 99 BPM

## 2022-10-05 DIAGNOSIS — Z28.21 TETANUS, DIPHTHERIA, AND ACELLULAR PERTUSSIS (TDAP) VACCINATION DECLINED: ICD-10-CM

## 2022-10-05 DIAGNOSIS — O26.899 RH NEGATIVE STATE IN ANTEPARTUM PERIOD: Primary | ICD-10-CM

## 2022-10-05 DIAGNOSIS — Z67.91 RH NEGATIVE STATE IN ANTEPARTUM PERIOD: Primary | ICD-10-CM

## 2022-10-05 DIAGNOSIS — O99.019 ANEMIA, ANTEPARTUM: ICD-10-CM

## 2022-10-05 PROCEDURE — 99213 OFFICE O/P EST LOW 20 MIN: CPT | Performed by: ADVANCED PRACTICE MIDWIFE

## 2022-10-05 NOTE — PROGRESS NOTES
SUBJECTIVE:    Higinio Masterson is here for her routine OB visit. She reports  fetal movement. She denies  vaginal bleeding. She denies  leaking of fluid. She denies vaginal discharge. She denies  uterine contraction activity. She denies  nausea and/or vomiting. She denies  retaining fluid in her extremities. Having some contractions. OBJECTIVE:   Last menstrual period 12/27/2021, not currently breastfeeding. ASSESSMENT/PLAN:  IUP @ 40 weeks  S = D    The problem list was reviewed and updated as needed with any new problems    Altagracia will monitor for fetal movements daily. Fetal Kick Count was reinforced. She reports adequate kick counts Yes  GBS testing was completed and reviewed Yes  She received Tdap vaccine Yes  She received flu vaccine No  Signs of labor were discussed. Post-dates testing protocol was discussed. Higinio Masterson was scheduled for Shanel Crenshaw was counseled regarding all of the above    Higinio Masterson will be admitted to [facility] for iOL    The patient, Anatoly Elliott,  was seen with a total time spent of 20 minutes for the visit on this date of service by the [facility]  The time component, involved both face-to-face (counseling and education)  and non face-to-face time (care coordination), spent in determining the total time component.

## 2022-10-06 DIAGNOSIS — O99.019 ANEMIA, ANTEPARTUM: ICD-10-CM

## 2022-10-06 NOTE — TELEPHONE ENCOUNTER
Otilia Durbin is requesting a refill on ferrous sulfate 325 mg po daily.        Currently Pregnant: yes  Last OB visit: 10/5/22  Next OB visit: induction is her next appt    Last prescribing provider:  Kemi Rodriguez

## 2022-10-09 RX ORDER — FERROUS SULFATE 325(65) MG
TABLET ORAL
Qty: 60 TABLET | Refills: 5 | Status: SHIPPED | OUTPATIENT
Start: 2022-10-09

## 2022-10-10 ENCOUNTER — ANESTHESIA (OUTPATIENT)
Dept: LABOR AND DELIVERY | Age: 22
DRG: 560 | End: 2022-10-10
Payer: MEDICAID

## 2022-10-10 ENCOUNTER — APPOINTMENT (OUTPATIENT)
Dept: LABOR AND DELIVERY | Age: 22
DRG: 560 | End: 2022-10-10
Payer: MEDICAID

## 2022-10-10 ENCOUNTER — HOSPITAL ENCOUNTER (INPATIENT)
Age: 22
LOS: 2 days | Discharge: HOME | DRG: 560 | End: 2022-10-12
Attending: OBSTETRICS & GYNECOLOGY | Admitting: OBSTETRICS & GYNECOLOGY
Payer: MEDICAID

## 2022-10-10 ENCOUNTER — ANESTHESIA EVENT (OUTPATIENT)
Dept: LABOR AND DELIVERY | Age: 22
DRG: 560 | End: 2022-10-10
Payer: MEDICAID

## 2022-10-10 PROBLEM — Z34.90 ENCOUNTER FOR INDUCTION OF LABOR: Status: ACTIVE | Noted: 2022-10-10

## 2022-10-10 LAB
ABO/RH: NORMAL
ABSOLUTE EOS #: 0.17 K/UL (ref 0–0.44)
ABSOLUTE IMMATURE GRANULOCYTE: 0.12 K/UL (ref 0–0.3)
ABSOLUTE LYMPH #: 2.33 K/UL (ref 1.1–3.7)
ABSOLUTE MONO #: 1.04 K/UL (ref 0.1–1.2)
AMPHETAMINE SCREEN URINE: NEGATIVE
ANTIBODY SCREEN: NEGATIVE
ARM BAND NUMBER: NORMAL
BARBITURATE SCREEN URINE: NEGATIVE
BASOPHILS # BLD: 1 % (ref 0–2)
BASOPHILS ABSOLUTE: 0.07 K/UL (ref 0–0.2)
BENZODIAZEPINE SCREEN, URINE: NEGATIVE
CANNABINOID SCREEN URINE: NEGATIVE
COCAINE METABOLITE, URINE: NEGATIVE
EOSINOPHILS RELATIVE PERCENT: 1 % (ref 1–4)
EXPIRATION DATE: NORMAL
FENTANYL URINE: NEGATIVE
HCT VFR BLD CALC: 35 % (ref 36.3–47.1)
HEMOGLOBIN: 11.7 G/DL (ref 11.9–15.1)
IMMATURE GRANULOCYTES: 1 %
LYMPHOCYTES # BLD: 17 % (ref 24–43)
MCH RBC QN AUTO: 28 PG (ref 25.2–33.5)
MCHC RBC AUTO-ENTMCNC: 33.4 G/DL (ref 28.4–34.8)
MCV RBC AUTO: 83.7 FL (ref 82.6–102.9)
METHADONE SCREEN, URINE: NEGATIVE
MONOCYTES # BLD: 8 % (ref 3–12)
NRBC AUTOMATED: 0 PER 100 WBC
OPIATES, URINE: NEGATIVE
OXYCODONE SCREEN URINE: NEGATIVE
PDW BLD-RTO: 13.5 % (ref 11.8–14.4)
PHENCYCLIDINE, URINE: NEGATIVE
PLATELET # BLD: 182 K/UL (ref 138–453)
PMV BLD AUTO: 12.5 FL (ref 8.1–13.5)
RBC # BLD: 4.18 M/UL (ref 3.95–5.11)
SEG NEUTROPHILS: 72 % (ref 36–65)
SEGMENTED NEUTROPHILS ABSOLUTE COUNT: 10.19 K/UL (ref 1.5–8.1)
T. PALLIDUM, IGG: NONREACTIVE
TEST INFORMATION: NORMAL
WBC # BLD: 13.9 K/UL (ref 3.5–11.3)

## 2022-10-10 PROCEDURE — 86901 BLOOD TYPING SEROLOGIC RH(D): CPT

## 2022-10-10 PROCEDURE — 1220000000 HC SEMI PRIVATE OB R&B

## 2022-10-10 PROCEDURE — 86900 BLOOD TYPING SEROLOGIC ABO: CPT

## 2022-10-10 PROCEDURE — 6360000002 HC RX W HCPCS: Performed by: ADVANCED PRACTICE MIDWIFE

## 2022-10-10 PROCEDURE — 3700000025 EPIDURAL BLOCK: Performed by: ANESTHESIOLOGY

## 2022-10-10 PROCEDURE — 85025 COMPLETE CBC W/AUTO DIFF WBC: CPT

## 2022-10-10 PROCEDURE — 3E033VJ INTRODUCTION OF OTHER HORMONE INTO PERIPHERAL VEIN, PERCUTANEOUS APPROACH: ICD-10-PCS | Performed by: OBSTETRICS & GYNECOLOGY

## 2022-10-10 PROCEDURE — 6370000000 HC RX 637 (ALT 250 FOR IP): Performed by: ADVANCED PRACTICE MIDWIFE

## 2022-10-10 PROCEDURE — 6360000002 HC RX W HCPCS: Performed by: ANESTHESIOLOGY

## 2022-10-10 PROCEDURE — 2500000003 HC RX 250 WO HCPCS

## 2022-10-10 PROCEDURE — 86780 TREPONEMA PALLIDUM: CPT

## 2022-10-10 PROCEDURE — 80307 DRUG TEST PRSMV CHEM ANLYZR: CPT

## 2022-10-10 PROCEDURE — 2500000003 HC RX 250 WO HCPCS: Performed by: NURSE ANESTHETIST, CERTIFIED REGISTERED

## 2022-10-10 PROCEDURE — 2580000003 HC RX 258: Performed by: ADVANCED PRACTICE MIDWIFE

## 2022-10-10 PROCEDURE — 6360000002 HC RX W HCPCS

## 2022-10-10 PROCEDURE — 86850 RBC ANTIBODY SCREEN: CPT

## 2022-10-10 RX ORDER — SODIUM CHLORIDE, SODIUM LACTATE, POTASSIUM CHLORIDE, AND CALCIUM CHLORIDE .6; .31; .03; .02 G/100ML; G/100ML; G/100ML; G/100ML
500 INJECTION, SOLUTION INTRAVENOUS PRN
Status: DISCONTINUED | OUTPATIENT
Start: 2022-10-10 | End: 2022-10-11

## 2022-10-10 RX ORDER — ONDANSETRON 2 MG/ML
4 INJECTION INTRAMUSCULAR; INTRAVENOUS EVERY 6 HOURS PRN
Status: DISCONTINUED | OUTPATIENT
Start: 2022-10-10 | End: 2022-10-11

## 2022-10-10 RX ORDER — NALBUPHINE HYDROCHLORIDE 20 MG/ML
10 INJECTION, SOLUTION INTRAMUSCULAR; INTRAVENOUS; SUBCUTANEOUS
Status: DISCONTINUED | OUTPATIENT
Start: 2022-10-10 | End: 2022-10-11

## 2022-10-10 RX ORDER — SODIUM CHLORIDE, SODIUM LACTATE, POTASSIUM CHLORIDE, AND CALCIUM CHLORIDE .6; .31; .03; .02 G/100ML; G/100ML; G/100ML; G/100ML
1000 INJECTION, SOLUTION INTRAVENOUS PRN
Status: DISCONTINUED | OUTPATIENT
Start: 2022-10-10 | End: 2022-10-11

## 2022-10-10 RX ORDER — SODIUM CHLORIDE 9 MG/ML
25 INJECTION, SOLUTION INTRAVENOUS PRN
Status: DISCONTINUED | OUTPATIENT
Start: 2022-10-10 | End: 2022-10-11

## 2022-10-10 RX ORDER — ACETAMINOPHEN 325 MG/1
650 TABLET ORAL EVERY 4 HOURS PRN
Status: DISCONTINUED | OUTPATIENT
Start: 2022-10-10 | End: 2022-10-10

## 2022-10-10 RX ORDER — ACETAMINOPHEN 500 MG
1000 TABLET ORAL EVERY 6 HOURS PRN
Status: DISCONTINUED | OUTPATIENT
Start: 2022-10-10 | End: 2022-10-11

## 2022-10-10 RX ORDER — ACETAMINOPHEN 500 MG
1000 TABLET ORAL EVERY 4 HOURS PRN
Status: DISCONTINUED | OUTPATIENT
Start: 2022-10-10 | End: 2022-10-10

## 2022-10-10 RX ORDER — NALOXONE HYDROCHLORIDE 0.4 MG/ML
INJECTION, SOLUTION INTRAMUSCULAR; INTRAVENOUS; SUBCUTANEOUS PRN
Status: DISCONTINUED | OUTPATIENT
Start: 2022-10-10 | End: 2022-10-11

## 2022-10-10 RX ORDER — LIDOCAINE HYDROCHLORIDE AND EPINEPHRINE 15; 5 MG/ML; UG/ML
INJECTION, SOLUTION EPIDURAL PRN
Status: DISCONTINUED | OUTPATIENT
Start: 2022-10-10 | End: 2022-10-11 | Stop reason: SDUPTHER

## 2022-10-10 RX ORDER — SODIUM CHLORIDE 0.9 % (FLUSH) 0.9 %
5-40 SYRINGE (ML) INJECTION EVERY 12 HOURS SCHEDULED
Status: DISCONTINUED | OUTPATIENT
Start: 2022-10-10 | End: 2022-10-11

## 2022-10-10 RX ORDER — DOCUSATE SODIUM 100 MG/1
100 CAPSULE, LIQUID FILLED ORAL 2 TIMES DAILY
Status: DISCONTINUED | OUTPATIENT
Start: 2022-10-10 | End: 2022-10-11

## 2022-10-10 RX ORDER — SODIUM CHLORIDE, SODIUM LACTATE, POTASSIUM CHLORIDE, CALCIUM CHLORIDE 600; 310; 30; 20 MG/100ML; MG/100ML; MG/100ML; MG/100ML
INJECTION, SOLUTION INTRAVENOUS CONTINUOUS
Status: DISCONTINUED | OUTPATIENT
Start: 2022-10-10 | End: 2022-10-11

## 2022-10-10 RX ORDER — NALBUPHINE HYDROCHLORIDE 20 MG/ML
5 INJECTION, SOLUTION INTRAMUSCULAR; INTRAVENOUS; SUBCUTANEOUS EVERY 4 HOURS PRN
Status: DISCONTINUED | OUTPATIENT
Start: 2022-10-10 | End: 2022-10-11

## 2022-10-10 RX ORDER — ROPIVACAINE HYDROCHLORIDE 2 MG/ML
INJECTION, SOLUTION EPIDURAL; INFILTRATION; PERINEURAL
Status: COMPLETED
Start: 2022-10-10 | End: 2022-10-11

## 2022-10-10 RX ORDER — SODIUM CHLORIDE 0.9 % (FLUSH) 0.9 %
5-40 SYRINGE (ML) INJECTION PRN
Status: DISCONTINUED | OUTPATIENT
Start: 2022-10-10 | End: 2022-10-11

## 2022-10-10 RX ADMIN — ACETAMINOPHEN 1000 MG: 500 TABLET ORAL at 12:32

## 2022-10-10 RX ADMIN — SODIUM CHLORIDE, PRESERVATIVE FREE 10 ML: 5 INJECTION INTRAVENOUS at 11:10

## 2022-10-10 RX ADMIN — ROPIVACAINE HYDROCHLORIDE 12 ML/HR: 2 INJECTION, SOLUTION EPIDURAL; INFILTRATION at 23:58

## 2022-10-10 RX ADMIN — LIDOCAINE HYDROCHLORIDE,EPINEPHRINE BITARTRATE 3 ML: 15; .005 INJECTION, SOLUTION EPIDURAL; INFILTRATION; INTRACAUDAL; PERINEURAL at 23:48

## 2022-10-10 RX ADMIN — Medication 10 MG: at 21:30

## 2022-10-10 RX ADMIN — Medication 1 MILLI-UNITS/MIN: at 16:14

## 2022-10-10 RX ADMIN — SODIUM CHLORIDE, POTASSIUM CHLORIDE, SODIUM LACTATE AND CALCIUM CHLORIDE: 600; 310; 30; 20 INJECTION, SOLUTION INTRAVENOUS at 22:57

## 2022-10-10 RX ADMIN — SODIUM CHLORIDE, POTASSIUM CHLORIDE, SODIUM LACTATE AND CALCIUM CHLORIDE: 600; 310; 30; 20 INJECTION, SOLUTION INTRAVENOUS at 16:08

## 2022-10-10 ASSESSMENT — PAIN SCALES - GENERAL
PAINLEVEL_OUTOF10: 4
PAINLEVEL_OUTOF10: 8

## 2022-10-10 ASSESSMENT — PAIN DESCRIPTION - DESCRIPTORS: DESCRIPTORS: CRAMPING

## 2022-10-10 NOTE — PROGRESS NOTES
Attending Nurse-Midiwfe Statement    I was present and have discussed the care of Lulú Tapia, including pertinent history and exam findings,  with the Student Nurse-Midwife. I have reviewed the key elements of all parts of the encounter with the Student Nurse-Midwife. I agree with the Assessment, Plan and Orders as documented by the Student Nurse-Midwife. Maeveyan Buitrago [facility] AT [place] Labor Note    SUBJECTIVE:    Patient is working well with labor. Comfort measures include bouncing on birthing ball, assisting with relief. Mom and FOB at the besides providing support. OBJECTIVE:     VS:  height is 5' 11\" (1.803 m) and weight is 186 lb (84.4 kg). Her oral temperature is 97.9 °F (36.6 °C). Her blood pressure is 125/86 and her pulse is 87. Her respiration is 20.      FHT:    Baseline: 135   Variability: moderate              Accels: present   Decels: Absent      Scalp electrode: No    Contraction pattern: irregular                                 Frequency: 3-5min                                 Duration: 50-60sec                                 Intensity: moderate                                 Pitocin: no                                 IUPC:  No    Cervix: deferred             Status of membranes: intact    Pain control: none needed at this time    Maternal status (hydration, fatigue, voids):  - Tolerating PO fluids and snacks  - Voiding without difficulty     ASSESSMENT/PLAN:    Early Labor  - Coping well with contractions  - Continue working with birthing ball and position changes  - Encourage nipple stimulation    FHR Category 1

## 2022-10-10 NOTE — FLOWSHEET NOTE
Pt received to L&D per ambulatory for scheduled risk reducing induction of labor. Placed in room 705. Up to BR et gowned. Support person at side.

## 2022-10-10 NOTE — PROGRESS NOTES
Advance Care Planning     Advance Care Planning Inpatient Note  [facility] Department    Today's Date: 10/10/2022  Unit: [facility] 7A LABOR & DELIVERY    Received request from admission screening. Upon review of chart and communication with care team, patient's decision making abilities are not in question. . Patient, Friends, and s/o, mother  was/were present in the room during visit. Goals of ACP Conversation:  Discuss advance care planning documents    Health Care Decision Makers:       Primary Decision Maker: Ramon Sheriff - Parent - [phone]    Secondary Decision Maker: Lazaro Kussmaul - Parent - [phone]  Summary:  Completed New Documents    Advance Care Planning Documents (Patient Wishes):  Healthcare Power of /Advance Directive Appointment of Health Care Agent     Assessment:  Elena Clifton is anticipating the birth of her son, with pt's mother, s/o, and friend present. Writer confirmed that pt wished to complete HC-POA at this time, and then asked if it was okay to discuss with visitors present. Interventions:  Provided education on documents for clarity and greater understanding  Discussed and provided education on state decision maker hierarchy  Assisted in the completion of documents according to patient's wishes at this time  Encouraged ongoing ACP conversation with future decision makers and loved ones  Reviewed but did not complete ACP document  HC-POA completed  LW explained but not completed  Care Preferences Communicated:   No    Outcomes/Plan:  New advance directive completed. Returned original document(s) to patient, as well as copies for distribution to appointed agents  Copy of advance directive given to staff to scan into medical record.   Teach Back Method used to verify the patient's and/or Healthcare Decision Maker's understanding of key information in the advance directive documents    Electronically signed by Chaplain Lombardi Ace on 10/10/2022 at 3:48 PM 10/10/22 1545   Encounter Summary   Service Provided For: Patient and family together   Referral/Consult From: Multi-disciplinary team   Support System Parent; Family members;Significant other   Last Encounter  10/10/22   Complexity of Encounter Moderate   Begin Time 1430   End Time  1515   Total Time Calculated 45 min   Encounter    Type Initial Screen/Assessment   Advance Care Planning   Type ACP conversation; Completed AD/ACP document(s)   Assessment/Intervention/Outcome   Assessment Calm   Intervention Prayer (assurance of)/[place]  (ACP)   Outcome Engaged in conversation;Expressed Gratitude

## 2022-10-10 NOTE — CARE COORDINATION
ANTEPARTUM NOTE    Encounter for induction of labor [Z34.90]    Zeinab Stager was admitted to L&D on 10/10/2022 for scheduled RR IOL @ 41w0d    OB GYN Provider: mmw    Will meet with patient after delivery to verify name/address/phone/insurance and discuss discharge planning. Anticipate DC home 2 nights after vaginal delivery or 4 nights after C/S delivery as long as hemodynamically stable.

## 2022-10-10 NOTE — PROGRESS NOTES
Attending Nurse-Midiw Statement  I was present during the Physical, ROS and Physical Exam of the patient  I have discussed the care of João Blanco, including pertinent history and exam findings,  with the Student Nurse-Midwife. I have reviewed the key elements of all parts of the encounter with the Student Nurse-Midwife. I agree with the Assessment, Plan and Orders as documented by the Student Nurse-Midwife. [facility] AT [place] Labor Note    SUBJECTIVE:    Patient is working well with labor. Comfort measures include walking and bouncing on birth ball. OBJECTIVE:     VS:  height is 5' 11\" (1.803 m) and weight is 186 lb (84.4 kg). Her oral temperature is 98.2 °F (36.8 °C). Her blood pressure is 127/75 and her pulse is 86. Her respiration is 16. FHT:    Baseline: 140   Variability: moderate              Accels: present   Decels: Absent    Scalp electrode: No    Contraction pattern: irregular                                 Frequency: 4-6 min                                 Duration: 40-60sec                                 Intensity: Moderate                                 Pitocin: No                                 IUPC:  No  Cervix:             Dilation: 3            Effacement: 80%            Station: 0            Position: posterior            Consistency: soft    Status of membranes: intact    Pain control: none at this time    Maternal status (hydration, fatigue, voids):  - Oral hydration  - Voiding without difficulty  - In great spirits     ASSESSMENT/PLAN:    Early Labor, prolonged first stage  - Due to no cervical change, discussed augmentation with pt and agreeable to starting Pitocin  - Continue movement, bouncing and frequent position changes while in bed    FHR Category 1  - Continuous monitoring    Plan discussed with Dr. Doron Steele, senior resident.

## 2022-10-10 NOTE — H&P
Attending Nurse-Cliftoniw Statement  I was present during the Physical, ROS and Physical Exam of the patient  I have discussed the care of Calista Lomas, including pertinent history and exam findings,  with the Student Nurse-Midwife. I have reviewed the key elements of all parts of the encounter with the Student Nurse-Midwife. I agree with the Assessment, Plan and Orders as documented by the Student Nurse-Midwife. [id number] [facility] Obstetrics History and Physical  October 10, 2022  10:35 AM    SUBJECTIVE:    CHIEF COMPLAINT:  Early Labor  Pt presents with ctx starting last night [id number], increasing to 5-6min this morning. Denies bleeding or LOF. HISTORY OF PRESENT ILLNESS:      The patient is a 23 y.o. female at 37w0d. 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 Confirmed by:    PRENATAL LABS:    Blood Type/Rh: A neg  Antibody Screen: negative  Hemoglobin, Hematocrit, Platelets: 61.5 / 02.3 / 263  Rubella: immune  T.  Pallidum, IgG: negative   Hepatitis B Surface Antigen: non-reactive   HIV: negative   Sickle Cell Screen: positive, alpha and beta thalassemia carrier, FOB neg  Gonorrhea: negative  Chlamydia: negative  Urine culture: negative    1 hour Glucose Tolerance Test: 129      Group B Strep: negative  Cystic Fibrosis Screen: negative  First Trimester Screen: patient declined  MSAFP/Multiple Markers: not available  Non-Invasive Prenatal Testing: patient declined     Steroids:  no    PAST OB HISTORY:  OB History    Para Term  AB Living   1 0 0 0 0 0   SAB IAB Ectopic Molar Multiple Live Births   0 0 0 0 0 0      # Outcome Date GA Lbr Greg/2nd Weight Sex Delivery Anes PTL Lv   1 Current                Past Medical History:        Diagnosis Date    Family history of uterine cancer 2019    MGMA- dx in 30s-40s MyRisk screening initiated 2019    Irregular menses        Past Surgical History:        Procedure Laterality Date    INTRAUTERINE DEVICE INSERTION  2019    Mirena. Lot [id number]  Exp 2020    INTRAUTERINE DEVICE REMOVAL  10/26/2020    TONSILLECTOMY AND ADENOIDECTOMY  age 1       Allergies:    Patient has no known allergies.     Social History:    Social History     Socioeconomic History    Marital status: Single     Spouse name: Not on file    Number of children: Not on file    Years of education: Not on file    Highest education level: Not on file   Occupational History    Not on file   Tobacco Use    Smoking status: Never    Smokeless tobacco: Never   Vaping Use    Vaping Use: Never used   Substance and Sexual Activity    Alcohol use: Not Currently     Comment: rarely    Drug use: Yes     Types: Marijuana Nan Edward)     Comment: stopped 2022    Sexual activity: Yes     Partners: Male     Birth control/protection: Pill   Other Topics Concern    Not on file   Social History Narrative    Not on file     Social Determinants of Health     Financial Resource Strain: Not on file   Food Insecurity: Not on file   Transportation Needs: Not on file   Physical Activity: Not on file   Stress: Not on file   Social Connections: Not on file   Intimate Partner Violence: Not on file   Housing Stability: Not on file       Family History:       Problem Relation Age of Onset    Other Maternal Grandmother lung cancer, smoker    Cancer Maternal Grandmother         uterine cancer, 30s to 40s       Medications Prior to Admission:  Medications Prior to Admission: FEROSUL 325 (65 Fe) MG tablet, TAKE 1 TABLET BY MOUTH TWICE DAILY  Prenatal Vit-Fe Fumarate-FA (PRENATAL VITAMIN) 27-0.8 MG TABS, Take 1 tablet by mouth daily    REVIEW OF SYSTEMS:    CONSTITUTIONAL:  Denies fever, chills, malaise  RESPIRATORY:  Denies SOB, wheezing, URI  CARDIOVASCULAR:  Denies edema, palpitations, syncope  GASTROINTESTINAL:  Denies nausea, vomiting, diarrhea  GENITOURINARY: Denies hematuria, frequency, dysuria, reports fetal movement, denies leaking of fluid, denies vaginal bleeding, reports contractions. NEUROLOGICAL:  Denies migraine headaches, tingling, numbness  BEHAVIOR/PSYCH:  Denies depression, anxiety, mood changes    PHYSICAL EXAM:     There were no vitals filed for this visit. General appearance:  Alert, no apparent distress, alert and cooperative  Skin:  Warm, dry, no rashes or erythema  Neurologic:  alert, oriented, normal speech and gait, normal reflexes  Lungs:  No increased work of breathing, good air exchange, clear to auscultation bilaterally, no crackles or wheezing  Heart:  regular rate and rhythm and no murmur   Breast:  Deferred   Abdomen:  soft, non-tender, no right upper quadrant tenderness, no CVA tenderness.   Gravid and consistent with her gestational age, EFW by Leopold's Maneuver was 7# Uterus non-tender, no signs of abruption and no signs of chorioamnionitis  Extremities:  no calf tenderness, non edematous, DTRs: normal    Pelvic Exam:    Speculum: not indicated   Cervix Check: 3 cm dilated, 80 % effaced, 0 station, posterior position, soft consistency, Cephalic    Bishops Score:    0 1 2 3 Patient    Dilation Closed 1-2 3-4 5-6 2    Effacement 0-30 40-50 60-70 >80 3    Station -3 -2 -1/0 +1/+2 2    Consistency Firm Med Soft  2    Position Post Mid Ant  0   TOTAL        9        MEMBRANES:  Intact FETAL HEART RATE:       Baseline: 135 bpm     Variability: moderate     Accelerations: present     Decelerations: absent            CONTRACTIONS: Frequency: 3-5min                                 Duration: 30-60min                                   ASSESSMENT/PLAN:  Riya Enriquez is a 23 y.o. female   At 37w0d     Early Labor  - Admit, routine labor orders  - Written consent for UDS obtained, specimen collected  - Encourage movement, walking and labor ball    FHR: Category 1    GBS negative  - no indication for prophylaxis     Rh Negative  - Received Rhogam @ 28wks 22  - Rhogam PP as needed    Alpha and Beta Thalassemia Carrier  - FOB negative    Discussed plan with Dr. Ashia Jeong, senior resident.

## 2022-10-10 NOTE — PROGRESS NOTES
Labor Progress Note    Ladonna Hopson is a 23 y.o. female  at 37w0d  The patient was seen and examined. Her pain is well controlled. She reports fetal movement is present, denies contractions, denies loss of fluid, denies vaginal bleeding.        Vital Signs:  Vitals:    10/10/22 1604 10/10/22 1704 10/10/22 1800 10/10/22 1856   BP: 136/83 121/73 112/78 139/82   Pulse: 97 86 91 (!) 104   Resp: 20 20 20 20   Temp: 98.1 °F (36.7 °C)      TempSrc: Oral      Weight:       Height:           FHT: 130, moderate variability, accelerations present, decelerations absent  Contractions: regular, every 5-6 minutes    Chaperone for Intimate Exam: Chaperone was present for entire exam, Chaperone Name: Francisca Gill RN  Cervical Exam: Deferred at this time    Membranes: Intact  Scalp Electrode in place: absent  Intrauterine Pressure Catheter in Place: absent    Interventions: none    Assessment/Plan:  Ladonna Hopson is a 23 y.o. female  at 37w0d admitted for RR IOL    - GBS negative, No indication for GBS prophylaxis  - VSS, Afebrile  - cEFM/TOCO  - Pitocin per protocol   - Anticipate vaginal delivery  - Continue to monitor            Attending updated and in agreement with plan    Teressa Fontanez DO  Ob/Gyn Resident  10/10/2022, 7:19 PM

## 2022-10-11 PROBLEM — O09.93 HIGH-RISK PREGNANCY IN THIRD TRIMESTER: Status: ACTIVE | Noted: 2022-10-11

## 2022-10-11 PROCEDURE — 6370000000 HC RX 637 (ALT 250 FOR IP): Performed by: ADVANCED PRACTICE MIDWIFE

## 2022-10-11 PROCEDURE — 7200000001 HC VAGINAL DELIVERY

## 2022-10-11 PROCEDURE — 51701 INSERT BLADDER CATHETER: CPT

## 2022-10-11 PROCEDURE — 2500000003 HC RX 250 WO HCPCS: Performed by: STUDENT IN AN ORGANIZED HEALTH CARE EDUCATION/TRAINING PROGRAM

## 2022-10-11 PROCEDURE — 6360000002 HC RX W HCPCS: Performed by: ANESTHESIOLOGY

## 2022-10-11 PROCEDURE — 0KQM0ZZ REPAIR PERINEUM MUSCLE, OPEN APPROACH: ICD-10-PCS | Performed by: ADVANCED PRACTICE MIDWIFE

## 2022-10-11 PROCEDURE — 59409 OBSTETRICAL CARE: CPT | Performed by: ADVANCED PRACTICE MIDWIFE

## 2022-10-11 PROCEDURE — 96372 THER/PROPH/DIAG INJ SC/IM: CPT

## 2022-10-11 PROCEDURE — 6360000002 HC RX W HCPCS

## 2022-10-11 PROCEDURE — 1220000000 HC SEMI PRIVATE OB R&B

## 2022-10-11 PROCEDURE — 2580000003 HC RX 258: Performed by: ADVANCED PRACTICE MIDWIFE

## 2022-10-11 PROCEDURE — 10907ZC DRAINAGE OF AMNIOTIC FLUID, THERAPEUTIC FROM PRODUCTS OF CONCEPTION, VIA NATURAL OR ARTIFICIAL OPENING: ICD-10-PCS | Performed by: ADVANCED PRACTICE MIDWIFE

## 2022-10-11 PROCEDURE — 6370000000 HC RX 637 (ALT 250 FOR IP)

## 2022-10-11 PROCEDURE — 6360000002 HC RX W HCPCS: Performed by: STUDENT IN AN ORGANIZED HEALTH CARE EDUCATION/TRAINING PROGRAM

## 2022-10-11 RX ORDER — ACETAMINOPHEN 500 MG
1000 TABLET ORAL EVERY 6 HOURS PRN
Status: DISCONTINUED | OUTPATIENT
Start: 2022-10-12 | End: 2022-10-11

## 2022-10-11 RX ORDER — ACETAMINOPHEN 500 MG
1000 TABLET ORAL EVERY 8 HOURS PRN
Status: DISCONTINUED | OUTPATIENT
Start: 2022-10-11 | End: 2022-10-11

## 2022-10-11 RX ORDER — IBUPROFEN 600 MG/1
600 TABLET ORAL EVERY 6 HOURS
Status: DISCONTINUED | OUTPATIENT
Start: 2022-10-12 | End: 2022-10-12 | Stop reason: HOSPADM

## 2022-10-11 RX ORDER — DOCUSATE SODIUM 100 MG/1
100 CAPSULE, LIQUID FILLED ORAL 2 TIMES DAILY PRN
Qty: 60 CAPSULE | Refills: 0 | Status: SHIPPED | OUTPATIENT
Start: 2022-10-11

## 2022-10-11 RX ORDER — SODIUM CHLORIDE 9 MG/ML
INJECTION, SOLUTION INTRAVENOUS PRN
Status: DISCONTINUED | OUTPATIENT
Start: 2022-10-11 | End: 2022-10-12 | Stop reason: HOSPADM

## 2022-10-11 RX ORDER — DOCUSATE SODIUM 100 MG/1
100 CAPSULE, LIQUID FILLED ORAL 2 TIMES DAILY
Status: DISCONTINUED | OUTPATIENT
Start: 2022-10-11 | End: 2022-10-12 | Stop reason: HOSPADM

## 2022-10-11 RX ORDER — IBUPROFEN 600 MG/1
600 TABLET ORAL EVERY 6 HOURS
Status: DISCONTINUED | OUTPATIENT
Start: 2022-10-11 | End: 2022-10-11

## 2022-10-11 RX ORDER — METHYLERGONOVINE MALEATE 0.2 MG/ML
200 INJECTION INTRAVENOUS ONCE
Status: COMPLETED | OUTPATIENT
Start: 2022-10-11 | End: 2022-10-11

## 2022-10-11 RX ORDER — SODIUM CHLORIDE, SODIUM LACTATE, POTASSIUM CHLORIDE, CALCIUM CHLORIDE 600; 310; 30; 20 MG/100ML; MG/100ML; MG/100ML; MG/100ML
INJECTION, SOLUTION INTRAVENOUS CONTINUOUS
Status: DISCONTINUED | OUTPATIENT
Start: 2022-10-11 | End: 2022-10-12 | Stop reason: HOSPADM

## 2022-10-11 RX ORDER — IBUPROFEN 800 MG/1
800 TABLET ORAL EVERY 8 HOURS PRN
Qty: 60 TABLET | Refills: 1 | Status: SHIPPED | OUTPATIENT
Start: 2022-10-11

## 2022-10-11 RX ORDER — SODIUM CHLORIDE 0.9 % (FLUSH) 0.9 %
5-40 SYRINGE (ML) INJECTION EVERY 12 HOURS SCHEDULED
Status: DISCONTINUED | OUTPATIENT
Start: 2022-10-11 | End: 2022-10-12 | Stop reason: HOSPADM

## 2022-10-11 RX ORDER — ACETAMINOPHEN 500 MG
1000 TABLET ORAL EVERY 6 HOURS PRN
Status: DISCONTINUED | OUTPATIENT
Start: 2022-10-11 | End: 2022-10-11

## 2022-10-11 RX ORDER — ACETAMINOPHEN 500 MG
1000 TABLET ORAL EVERY 6 HOURS PRN
Status: DISCONTINUED | OUTPATIENT
Start: 2022-10-11 | End: 2022-10-12 | Stop reason: HOSPADM

## 2022-10-11 RX ORDER — CARBOPROST TROMETHAMINE 250 UG/ML
250 INJECTION, SOLUTION INTRAMUSCULAR ONCE
Status: COMPLETED | OUTPATIENT
Start: 2022-10-11 | End: 2022-10-11

## 2022-10-11 RX ORDER — SODIUM CHLORIDE 0.9 % (FLUSH) 0.9 %
5-40 SYRINGE (ML) INJECTION PRN
Status: DISCONTINUED | OUTPATIENT
Start: 2022-10-11 | End: 2022-10-12 | Stop reason: HOSPADM

## 2022-10-11 RX ORDER — IBUPROFEN 800 MG/1
800 TABLET ORAL EVERY 8 HOURS
Status: DISCONTINUED | OUTPATIENT
Start: 2022-10-11 | End: 2022-10-11

## 2022-10-11 RX ORDER — LANOLIN 72 %
OINTMENT (GRAM) TOPICAL PRN
Status: DISCONTINUED | OUTPATIENT
Start: 2022-10-11 | End: 2022-10-12 | Stop reason: HOSPADM

## 2022-10-11 RX ADMIN — ACETAMINOPHEN 1000 MG: 500 TABLET ORAL at 22:17

## 2022-10-11 RX ADMIN — ACETAMINOPHEN 1000 MG: 500 TABLET ORAL at 16:05

## 2022-10-11 RX ADMIN — DOCUSATE SODIUM 100 MG: 100 CAPSULE ORAL at 22:16

## 2022-10-11 RX ADMIN — ROPIVACAINE HYDROCHLORIDE 10 ML/HR: 2 INJECTION, SOLUTION EPIDURAL; INFILTRATION at 04:32

## 2022-10-11 RX ADMIN — METHYLERGONOVINE MALEATE 200 MCG: 0.2 INJECTION, SOLUTION INTRAMUSCULAR; INTRAVENOUS at 06:50

## 2022-10-11 RX ADMIN — BENZOCAINE AND LEVOMENTHOL: 200; 5 SPRAY TOPICAL at 16:08

## 2022-10-11 RX ADMIN — IBUPROFEN 800 MG: 800 TABLET, FILM COATED ORAL at 07:37

## 2022-10-11 RX ADMIN — IBUPROFEN 800 MG: 800 TABLET, FILM COATED ORAL at 16:05

## 2022-10-11 RX ADMIN — ACETAMINOPHEN 1000 MG: 500 TABLET ORAL at 07:37

## 2022-10-11 RX ADMIN — SODIUM CHLORIDE, POTASSIUM CHLORIDE, SODIUM LACTATE AND CALCIUM CHLORIDE: 600; 310; 30; 20 INJECTION, SOLUTION INTRAVENOUS at 02:37

## 2022-10-11 RX ADMIN — CARBOPROST TROMETHAMINE 250 MCG: 250 INJECTION, SOLUTION INTRAMUSCULAR at 06:55

## 2022-10-11 RX ADMIN — IBUPROFEN 600 MG: 800 TABLET, FILM COATED ORAL at 22:16

## 2022-10-11 RX ADMIN — ONDANSETRON 4 MG: 2 INJECTION INTRAMUSCULAR; INTRAVENOUS at 07:08

## 2022-10-11 ASSESSMENT — PAIN DESCRIPTION - LOCATION: LOCATION: PERINEUM

## 2022-10-11 ASSESSMENT — PAIN SCALES - GENERAL
PAINLEVEL_OUTOF10: 5
PAINLEVEL_OUTOF10: 5

## 2022-10-11 ASSESSMENT — PAIN DESCRIPTION - DESCRIPTORS: DESCRIPTORS: SORE;TENDER

## 2022-10-11 NOTE — PLAN OF CARE
Problem: Vaginal Birth or  Section  Goal: Fetal and maternal status remain reassuring during the birth process  Description:  Birth OB-Pregnancy care plan goal which identifies if the fetal and maternal status remain reassuring during the birth process  10/11/2022 1804 by Shweta Sung RN  Outcome: Progressing  10/11/2022 171 by Shweta Sung RN  Outcome: Progressing     Problem: Postpartum  Goal: Experiences normal postpartum course  Description:  Postpartum OB-Pregnancy care plan goal which identifies if the mother is experiencing a normal postpartum course  10/11/2022 1804 by Shweta Sung RN  Outcome: Progressing  10/11/2022 1716 by Shweta Sung RN  Outcome: Progressing  Goal: Appropriate maternal -  bonding  Description:  Postpartum OB-Pregnancy care plan goal which identifies if the mother and  are bonding appropriately  10/11/2022 1804 by Shweta Sung RN  Outcome: Progressing  10/11/2022 171 by Shweta Sung RN  Outcome: Progressing  Goal: Establishment of infant feeding pattern  Description:  Postpartum OB-Pregnancy care plan goal which identifies if the mother is establishing a feeding pattern with their   10/11/2022 1804 by Shweta Sung RN  Outcome: Progressing  10/11/2022 1716 by Shweta Sung RN  Outcome: Progressing  Goal: Incisions, wounds, or drain sites healing without S/S of infection  10/11/2022 1804 by Shweta Sung RN  Outcome: Progressing  10/11/2022 171 by Shweta Sung RN  Outcome: Progressing     Problem: Pain  Goal: Verbalizes/displays adequate comfort level or baseline comfort level  10/11/2022 1804 by Shweta Sung RN  Outcome: Progressing  10/11/2022 1716 by Shweta Sung RN  Outcome: Progressing     Problem: Infection - Adult  Goal: Absence of infection at discharge  10/11/2022 1804 by Shweta Sung RN  Outcome: Progressing  10/11/2022 171 by Shweta Sung RN  Outcome: Progressing  Goal: Absence of infection during hospitalization  10/11/2022 1804 by Amelia Sanchez RN  Outcome: Progressing  10/11/2022 1716 by Amelia Sanchez RN  Outcome: Progressing  Goal: Absence of fever/infection during anticipated neutropenic period  10/11/2022 1804 by Amelia Sanchez RN  Outcome: Progressing  10/11/2022 1716 by Amelia Sanchez RN  Outcome: Progressing     Problem: Safety - Adult  Goal: Free from fall injury  10/11/2022 1804 by Amelia Sanchez RN  Outcome: Progressing  10/11/2022 1716 by Amelia Sanchez RN  Outcome: Progressing     Problem: Discharge Planning  Goal: Discharge to home or other facility with appropriate resources  10/11/2022 1804 by Amelia Sanchez RN  Outcome: Progressing  10/11/2022 1716 by Amelia Sanchez RN  Outcome: Progressing     Problem: Chronic Conditions and Co-morbidities  Goal: Patient's chronic conditions and co-morbidity symptoms are monitored and maintained or improved  10/11/2022 1804 by Amelia Sanchez RN  Outcome: Progressing  10/11/2022 1716 by Amelia Sanchez RN  Outcome: Progressing

## 2022-10-11 NOTE — CARE COORDINATION
CASE MANAGEMENT POST-PARTUM TRANSITIONAL CARE PLAN    Encounter for induction of labor [Z34.90]    OB Provider: mmw    Writer met w/ Rudy Jones and FOB/BF Rylansusanna Angela at bedside to discuss DCP. She is S/P  on 10/11/22 @ 41w1d at 56 of male infant    Writer updated address, phone number correct on facesheet. She states she lives with her BF/FOB Deo Miller [phone]. Rudy Jones verbalized no problems with transportation to and from doctors appointments or with paying for medications upon discharge home. Medicaid of [place] insurance correct. Writer notified Rudy Jones she has 30 days from date of birth to add  to insurance policy. She verbalized understanding. Rudy Jones and Edenilson Acevedo confirmed a safe place for infant to sleep at home. Infant name on BC: Richi Mathew. Infant PCP BRIANNA ROGERS [facility].      DME: none  HOME CARE: none    Anticipate DC of couplet 10/13/22    Readmission Risk              Risk of Unplanned Readmission:  4

## 2022-10-11 NOTE — PROGRESS NOTES
Labor Progress Note    Marlee Angel is a 23 y.o. female  at 40w1d  The patient was seen and examined. Her pain is well controlled with epidural. She reports fetal movement is present, complains of contractions, denies loss of fluid, denies vaginal bleeding.        Vital Signs:  Vitals:    10/11/22 0016 10/11/22 0030 10/11/22 0100 10/11/22 0200   BP: 122/64 127/79 130/80 (!) 114/51   Pulse: 83 80 89 91   Resp: 17 18 15 16   Temp:   98.6 °F (37 °C)    TempSrc:   Oral    SpO2: 98% 99% 98% 98%   Weight:       Height:             FHT: 120, moderate variability, accelerations present, decelerations absent  Contractions: regular, every 2-4 minutes    Chaperone for Intimate Exam: deferred to Midwife   Pitocin: @ 5 mu/min    Membranes: Intact  Scalp Electrode in place: absent  Intrauterine Pressure Catheter in Place: absent    Interventions: none    Assessment/Plan:  Marlee Angel is a 23 y.o. female  at 40w1d admitted for RR IOL   - GBS negative, No indication for GBS prophylaxis   - VSS, Afebrile   - cEFM/TOCO   - Continue pitocin per protocol    - S/p Nubain 10mg x 1   - Epidural in place    - Care per midwife - plan to AROM when able    - Continue current management       Senior resident updated and in agreement with plan    Penny Clifford DO  Ob/Gyn Resident  10/11/2022, 2:51 AM

## 2022-10-11 NOTE — ADDENDUM NOTE
Addendum  created 10/11/22 0719 by YADIRA Manzano CRNA    [facility] properties accepted

## 2022-10-11 NOTE — PROGRESS NOTES
Ob/Gyn Interval Note     Was called to room by midwife for concern for vaginal laceration. Patient was seen and evaluated and second-degree tear was noted. Repair was fixed in usual manner with a 3-0 Vicryl on CT. Patient tolerated procedure well. Patient was noted to have brisk bleeding. Bimanual exam was performed and l the lower uterine segment was noted to be boggy. Cervix was evaluated and no lacerations were noted. Bladder was drained. Uterine massage was initiated. IM Methergine 0.2 mg was given. Exam was repeated and there was minimal improvement in uterine atony. IM Hemabate 0.25 mg was given. Repeat vitals was obtained and within normal limits. She denied any lightheadedness, dizziness, chest pain, or shortness of breath. EBL at this time was 700ml. Bleeding was noted to be minimal and uterus was firm. We will continue to monitor patient closely.     Vitals:    10/11/22 0600 10/11/22 0630 10/11/22 0645 10/11/22 0700   BP:  129/84 118/72 121/76   Pulse:  (!) 103 99 (!) 106   Resp:  18 17 18   Temp:  99.1 °F (37.3 °C)     TempSrc:  Oral     SpO2: 97% 100% 97% 98%   Weight:       Height:             Sugey Ramos  OB/GYN Resident  [address] [facility]  10/11/2022 7:06 AM

## 2022-10-11 NOTE — ANESTHESIA PROCEDURE NOTES
Epidural Block    Patient location during procedure: OB  Start time: 10/10/2022 11:47 PM  Reason for block: labor epidural  Staffing  Performed: resident/CRNA   Resident/CRNA: YADIRA Guajardo - CRNA  Epidural  Patient position: sitting  Prep: Betadine  Patient monitoring: continuous pulse ox and frequent blood pressure checks  Approach: midline  Location: L3-4  Injection technique: KURTIS air  Guidance: paresthesia technique  Provider prep: mask and sterile gloves  Needle  Needle type: Tuohy   Needle gauge: 17 G  Needle length: 3.5 in  Needle insertion depth: 6 cm  Catheter type: end hole  Catheter size: 20 G  Catheter at skin depth: 11 cm  Test dose: negativeCatheter Secured: tegaderm and tape  Assessment  Sensory level: T6  Hemodynamics: stable  Attempts: 1  Outcomes: uncomplicated  Preanesthetic Checklist  Completed: patient identified, IV checked, site marked, risks and benefits discussed, surgical/procedural consents, equipment checked, pre-op evaluation, timeout performed, anesthesia consent given, oxygen available and monitors applied/VS acknowledged
- - -

## 2022-10-11 NOTE — DISCHARGE SUMMARY
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 female  at 41w0d admitted for RR IOL; received Pitocin, Nubain x1, Epidural, AROM (clr). Patient was noted to have uterine atony following delivery IM Methergine 0.2 mg x 1, IM Hemabate 0.25 mg x 1 were given    She delivered by spontaneous vaginal a Live Born infant on 10/11/22. Information for the patient's :  Rekha Bermeo [[id number]]   male   Birth Weight: 9 lb 8.2 oz (4.315 kg)     Apgars: 8 at 1 minute and 9 at 5 minutes.      Postpartum course: normal.      Course of patient: uncomplicated    Discharge to: Home    Readmission planned: no     Recommendations on Discharge:     Medications:      Medication List        START taking these medications      docusate sodium 100 MG capsule  Commonly known as: Colace  Take 1 capsule by mouth 2 times daily as needed for Constipation     ibuprofen 800 MG tablet  Commonly known as: ADVIL;MOTRIN  Take 1 tablet by mouth every 8 hours as needed for Pain            ASK your doctor about these medications      FeroSul 325 (65 Fe) MG tablet  Generic drug: ferrous sulfate  TAKE 1 TABLET BY MOUTH TWICE DAILY     Prenatal Vitamin 27-0.8 MG Tabs  Take 1 tablet by mouth daily               Where to Get Your Medications        These medications were sent to [facility] [address] [place] [address]  [address], [address] [place]  [address]      Phone: [phone]   docusate sodium 100 MG capsule  ibuprofen 800 MG tablet           Activity: pelvic rest x 6 weeks, no lifting greater than 15 lbs  Diet: regular diet  Follow up: 2 weeks for  Postpartum follow up    Condition on discharge: stable    Discharge date: 10/12/22    Ta Jacob MD  Ob/Gyn Resident    Comments:  Home care and follow-up care were reviewed. Pelvic rest, and birth control were reviewed. Signs and symptoms of mastitis and post partum depression were reviewed. The patient is to notify her physician if any of these occur. The patient was counseled on secondary smoke risks and the increased risk of sudden infant death syndrome and respiratory problems to her baby with exposure. She was counseled on various alternate recommendations to decrease the exposure to secondary smoke to her children.

## 2022-10-11 NOTE — L&D DELIVERY NOTE
Mother's Information      Labor Events     Labor?: No  Cervical Ripening:   Now               Apple James [[id number]]      Labor Events     Labor?: No   Steroids?: None  Cervical Ripening Date/Time:     Antibiotics Received during Labor?: No  Rupture Identifier: Sac 1   Rupture Date/Time: 10/11/22 04:02:00   Rupture Type: AROM  Fluid Color: Clear  Fluid Odor: None  Fluid Volume: Scant  Induction: Oxytocin, AROM  Augmentation: AROM  Labor Complications: None, Meconium at birth       Anesthesia    Method: Epidural, Nitrous Oxide       Start Pushing      Labor onset date/time:   Now     Dilation complete date/time:   Now     Start pushing date/time: 10/11/2022 04:36:00   Decision date/time (emergent ):           Delivery ([place])      Delivery Date/Time:  10/11/22 06:15:00   Delivery Type: Vaginal, Spontaneous    Details:            [place] Presentation    Presentation: Vertex  Position: Right  _: Occiput  _: Anterior       Shoulder Dystocia    Shoulder Dystocia Present?: No  Add Second Maneuver  Add Third Maneuver  Add Fourth Maneuver  Add Fifth Maneuver  Add Sixth Maneuver  Add Seventh Maneuver  Add Eighth Maneuver  Add Ninth Maneuver       Assisted Delivery Details    Forceps Attempted?: No  Vacuum Extractor Attempted?: No       Document Additional Attempt         Document Additional Attempt                 Cord    Vessels: 3 Vessels  Complications: Nuchal Loose  Cord Around: Head  Delayed Cord Clamping?: Yes  Cord Clamped Date/Time: 10/11/2022 06:16:00  Cord Blood Disposition: Lab  Gases Sent?: Yes       Placenta    Date/Time: 10/11/2022 06:25:00  Removal: Spontaneous  Appearance: Intact       Lacerations    Episiotomy: None  Perineal Lacerations: 2nd  Number of Repair Packets: 1       Vaginal Counts    Initial Count Personnel: [facility] ELIZ RN  Initial Count Verified By: Inderjit Barreto MM    Sponges Needles Instruments   Initial Counts Correct Correct Correct   Final Counts Correct Correct Correct   Final Count Personnel: Jhon Key MM  Final Count Verified By: Sandra Ware RN  Accurate Final Count?: Yes  If the count is incorrect due to Intentionally Retained Foreign Object (IRFO) add the IRFO LDA in Lines/Drains. Add LDA: Link to [facility]       Blood Loss  Mother: Jael Wheeler #[id number]     Start of Mother's Information      Delivery Blood Loss  10/10/22 1815 - 10/11/22 0711      Quantitative Blood Loss (mL) Hospital Encounter 757 grams    Total  757 mL               End of Mother's Information  Mother: Jael Wheeler #[id number]                Delivery Providers    Delivering clinician: YADIRA Fajardo CNM     Provider Role     Obstetrician    Triston Alfonso RN Primary Nurse    Sohail Perez, RN Primary  Nurse     NICU Nurse     Neonatologist     Anesthesiologist     Nurse Anesthetist     Nurse Practitioner    YADIRA Fajardo CNM Midwife     Nursery Nurse    Nolberto Anderson, DO Resident    Marci Webb, DO Resident              [place] Assessment    Living Status: Living     Apgar Scoring Key:    0 1 2    Skin Color: Blue or pale Acrocyanotic Completely pink    Heart Rate: Absent <100 bpm >100 bpm    Reflex Irritability: No response Grimace Cry or active withdrawal    Muscle Tone: Limp Some flexion Active motion    Respiratory Effort: Absent Weak cry; hypoventilation Good, crying                      Skin Color:   Heart Rate:   Reflex Irritability:   Muscle Tone:   Respiratory Effort:    Total:            1 Minute:    0    2    2    2    2    8        Apgar 1 total from OB History    5 Minute:    1    2    2    2    2    9        Apgar 5 total from OB History    10 Minute:              15 Minute:              20 Minute:                        Apgars Assigned By: Deniz Beltrán RN              Resuscitation    Method: Bulb Suction, Stimulation, Room Air              Measurements      Birth Weight: 4315 g Birth Length: 0.572 m              Title      Skin to Skin Initiation Date/Time: 10/11/22 06:19:00 EDT     Skin to Skin With: Mother     Skin to Skin End Date/Time:                  [facility] Vaginal Delivery Note         Pre-operative Diagnosis:  Induced labor    Post-operative Diagnosis:  Same, delivered    Procedure:  Spontaneous vaginal delivery    Provider:   YADIRA Jones CNM, MD, Marcos Swanson DO    Information for the patient's :  Dominique Matter [[id number]]        Anesthesia:  epidural anesthesia    Estimated blood loss:  700ml    Specimen:  Placenta not sent to pathology     Cord blood sent Yes    Complications:  nuchal cord x 1    Condition:  infant with Mom    Details of Procedure: The patient is a 23 y.o. female at 40w1d   OB History          1    Para        Term                AB        Living             SAB        IAB        Ectopic        Molar        Multiple        Live Births                 who was admitted for induction. She received the following interventions: ARBOW and IV Pitocin augmentation The patient progressed ,did receive an epidural, became complete and started to push. After pushing for 2 hours the fetal head  was at the perineum and the rest of the infant delivered atraumatically, shoulders easily delivered and placed on mother abdomen. The cord was clamped and cut and cord blood obtained. The delivery of the placenta was spontaneous. Placenta examination appears intact with a 3 vessel cord. Uterus contracted immediately and bleeding was notunder control. Cord insertion was central. The perineum and vagina were inspected and 2nd degree laceration was found. The laceration was  well approximated anatomically, actively bleeding and was repaired. Rectal exam revealed no palpable sutures and no defects. Pitocin was given IV, Methergine IM and Hemabate IM after delivery of placenta. Mother and baby stable.  Baby boy to breast.

## 2022-10-11 NOTE — PROGRESS NOTES
Labor Progress Note    Shannan Mauro is a 23 y.o. female  at 37w0d  The patient was seen and examined. Her pain is well controlled. She reports fetal movement is present, complains of contractions, denies loss of fluid, denies vaginal bleeding.        Vital Signs:  Vitals:    10/10/22 1800 10/10/22 1856 10/10/22 2006 10/10/22 2107   BP: 112/78 139/82 (!) 132/95 131/80   Pulse: 91 (!) 104 (!) 124 (!) 102   Resp: 20  18 18   Temp:   99.1 °F (37.3 °C) 98.7 °F (37.1 °C)   TempSrc:   Oral Oral   SpO2:    98%   Weight:       Height:             FHT: 140, moderate variability, accelerations present, decelerations absent  Contractions: irregular, every 4-5 minutes    Chaperone for Intimate Exam: deferred to Midwife   Pitocin: @ 10 mu/min    Membranes: Intact  Scalp Electrode in place: absent  Intrauterine Pressure Catheter in Place: absent    Interventions: none    Assessment/Plan:  Shannan Mauro is a 23 y.o. female  at 37w0d admitted for RR IOL   - GBS negative, No indication for GBS prophylaxis   - VSS, Afebrile   - cEFM/TOCO   - Continue pitocin per protocol    - Nubain 10mg x 1 for pain    - Care per midwife   - Continue current management       Senior resident updated and in agreement with plan    Ramses Adams DO  Ob/Gyn Resident  10/10/2022, 9:25 PM

## 2022-10-11 NOTE — PROGRESS NOTES
Attending Nurse-Midiwfe Statement  I have discussed the care of Josh Madera, including pertinent history and exam findings,  with the Student Nurse-Midwife. I have reviewed the key elements of all parts of the encounter with the Student Nurse-Midwife. I agree with the Assessment, Plan and Orders as documented by the Student Nurse-Midwife. [address] [place] [address] Labor Note    SUBJECTIVE:    Patient is working well with labor, starting to feel some pressure. OBJECTIVE:     VS:  height is 5' 11\" (1.803 m) and weight is 186 lb (84.4 kg). Her oral temperature is 98.6 °F (37 °C). Her blood pressure is 114/70 and her pulse is 92. Her respiration is 17 and oxygen saturation is 98%.      FHT:    Baseline: 125   Variability: moderate              Accels: present   Decels: Variable    Contraction pattern:                                  Frequency: 2-4min                                 Duration: 30-90sec                                 Intensity: Moderate                                 Pitocin: 5mu/min  Cervix:             Dilation: anterior lip            Effacement: 100            Station: +1           AROM - clear fluid, scant    Pain control: epidural    Maternal status (hydration, fatigue, voids):  - IV fluids  - Resting comfortably  - q2 bladder emptying clear urine     ASSESSMENT/PLAN:    Active Labor  - Anticipate vaginal birth    FHR Category 1

## 2022-10-11 NOTE — CARE COORDINATION
Social Work     Sw reviewed medical record (current active problem list) and tox screens and found no current concerns. Mom did have a + THC radha early on in pregnancy (3/3/22), mom is negative at time of delivery. Sw spoke with mom and dad Jordin Dill) briefly to explain Sw role, inquire if any needs or concerns, and provide safe sleep education and discuss. Mom denied any needs or questions and informs baby has a safe sleep environment (rodolfo henriquez). Mom denied any current s/s of anxiety or depression and is aware to reach out to [facility] (Midwives) if any s/s occur after dc. Mom reports a very good support system and denied any current questions or needs. Mom reports this is her 1st child, and fob's 2nd (he has a 5year old son who is excited). Mom states ped will be [facility]. Due to [facility] intake Wilfredo Arenas) was contacted. No other concerns, baby is cleared to dc home with mom. Sw encouraged parents to reach out if any issues or concerns arise.

## 2022-10-11 NOTE — PROGRESS NOTES
[facility] [place] Labor Note for Low Intervention    Subjective:    Patient is working well with labor. Comfort measures include ball, ambulation, assisting with relief. Support system helpful. Asking to be checked. Requesting Nitrous. Objective:     VS:  height is 5' 11\" (1.803 m) and weight is 186 lb (84.4 kg). Her oral temperature is 99.1 °F (37.3 °C). Her blood pressure is 132/95 (abnormal) and her pulse is 124 (abnormal). Her respiration is 18. FHT:    Baseline: 135   Variability: moderate   Decels: Absent   Accels: present         Contractions: q2-3 mins, moderate    Cx: 4 cm 90 soft 0 posterior  Pitocin at 10mu/min    Assessment/plan:   in labor  Requesting pain relief. Pitocin infusing at 10mu/min  Nitrous for pain relief  Advised positioning in hands/knees for progress.   Continue observation  Anticipate

## 2022-10-11 NOTE — FLOWSHEET NOTE
Nitrous oxide discontinued. Pt requesting Nubain will notify provider. And administer after 20 mins per protocol.

## 2022-10-11 NOTE — FLOWSHEET NOTE
[place] at bedside. 2340 positioned for epidural  2347 catheter placed. 2348 test dose given. 2353 loading dose given. 2352 positioned to low fowlers with left uterine displacement. 2356 pump initiated.

## 2022-10-11 NOTE — FLOWSHEET NOTE
Patient admitted to room 736 from L&D via wheelchair. Oriented to room and surroundings. Plan of care reviewed. Verbalized understanding. Instructed on infant security and safe sleep practices. Preventing falls education provided . The following handouts given: A New Beginning: Your Guide to Postpartum Care, Rounding, gs Security System,Babies Cry A lot, Safe Sleep, Security and Visitation Guidelines. Call light placed within reach.

## 2022-10-11 NOTE — ANESTHESIA POSTPROCEDURE EVALUATION
Department of Anesthesiology  Postprocedure Note    Patient: Susana Antoine  MRN: [id number]  YOB: 2000  Date of evaluation: 10/11/2022      Procedure Summary     Date: 10/10/22 Room / Location:     Anesthesia Start: 2356 Anesthesia Stop: 10/11/22 0615    Procedure: Labor Analgesia Diagnosis:     Scheduled Providers:  Responsible Provider: Edmund Bearden MD    Anesthesia Type: epidural ASA Status: 2          Anesthesia Type: No value filed.     Annette Phase I:      Annette Phase II:        Anesthesia Post Evaluation    Patient location during evaluation: bedside  Patient participation: complete - patient participated  Level of consciousness: awake  Pain score: 0  Nausea & Vomiting: no vomiting  Cardiovascular status: hemodynamically stable  Respiratory status: room air

## 2022-10-11 NOTE — ANESTHESIA PRE PROCEDURE
Department of Anesthesiology  Preprocedure Note       Name:  Miranda Lott   Age:  23 y. o.  :  2000                                          MRN:  [id number]         Date:  10/10/2022      Surgeon: * No surgeons listed *    Procedure: * No procedures listed *    Medications prior to admission:   Prior to Admission medications    Medication Sig Start Date End Date Taking?  Authorizing Provider   FEROSUL 325 (65 Fe) MG tablet TAKE 1 TABLET BY MOUTH TWICE DAILY 10/9/22   Sheela Weaver APRN - CNM   Prenatal Vit-Fe Fumarate-FA (PRENATAL VITAMIN) 27-0.8 MG TABS Take 1 tablet by mouth daily 3/3/22   Nida Adas, APRN - CNM       Current medications:    Current Facility-Administered Medications   Medication Dose Route Frequency Provider Last Rate Last Admin    ropivacaine (NAROPIN) 0.2% injection 0.2%             lactated ringers infusion   IntraVENous Continuous Sb Copeland, APRN -  mL/hr at 10/10/22 2257 New Bag at 10/10/22 2257    lactated ringers bolus  500 mL IntraVENous PRN Sariah B Franklyn, APRN - CNM        Or    lactated ringers bolus  1,000 mL IntraVENous PRN Sb Barn, APRN - CNM        sodium chloride flush 0.9 % injection 5-40 mL  5-40 mL IntraVENous 2 times per day Sb Bareugenio, APRN - CNM   10 mL at 10/10/22 1110    sodium chloride flush 0.9 % injection 5-40 mL  5-40 mL IntraVENous PRN Sariah B Franklyn, APRN - CNM        0.9 % sodium chloride infusion  25 mL IntraVENous PRN Sariah B Franklyn, APRN - CNM        ondansetron (ZOFRAN) injection 4 mg  4 mg IntraVENous Q6H PRN Sariah B Franklyn, APRN - CNM        oxytocin (PITOCIN) 30 units in 500 mL infusion  87.3 demian-units/min IntraVENous Continuous PRN Sariah B Franklyn, APRN - CNM        And    oxytocin (PITOCIN) 10 unit bolus from the bag  10 Units IntraVENous PRN Sariah B Franklyn, APRN - CNM        benzocaine-menthol (DERMOPLAST) 20-0.5 % spray   Topical PRN Sb Bareugenio, APRN - CNM        docusate sodium (COLACE) capsule 100 mg  100 mg Oral BID Bogdan Gander, APRN - CNM        witch hazel-glycerin (TUCKS) pad   Topical PRN Bogdan Gander, APRN - CNM        acetaminophen (TYLENOL) tablet 1,000 mg  1,000 mg Oral Q6H PRN Bogdan Gander, APRN - CNM   1,000 mg at 10/10/22 1232    oxytocin (PITOCIN) 30 units in 500 mL infusion  1-20 demian-units/min IntraVENous Continuous [place] Gander, APRN - CNM 10 mL/hr at 10/10/22 2153 10 demian-units/min at 10/10/22 2153    nalbuphine (NUBAIN) injection 10 mg  10 mg IntraVENous Q3H PRN Chencho Bergeron, APRN - CNM   10 mg at 10/10/22 2130    ropivacaine 0.2% in sodium chloride 0.9% (OB) epidural 100 mL  10 mL/hr Epidural Continuous Maria Guadalupe Vasquez MD        naloxone 0.4 mg in 10 mL sodium chloride syringe   IntraVENous PRN Maria Guadalupe Vasquez MD        nalbuphine (NUBAIN) injection 5 mg  5 mg IntraVENous Q4H PRN Maria Guadalupe Vasquez MD        ondansetron [facility]) injection 4 mg  4 mg IntraVENous Q6H PRN Maria Guadalupe Vasquez MD           Allergies:  No Known Allergies    Problem List:    Patient Active Problem List   Diagnosis Code    Hx of Chlamydia  infection (2021) A56.8    Supervision of normal first pregnancy, antepartum Z34.00    Positive urine drug screen R82.5    Rh negative state in antepartum period O26.899, Z67.91    Anemia, antepartum O99.019    Alpha thalassemia carrier D56.3    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  Lot [id number]  Exp 03/2020    INTRAUTERINE DEVICE REMOVAL  10/26/2020    TONSILLECTOMY AND ADENOIDECTOMY  age 1       Social History:    Social History     Tobacco Use    Smoking status: Never    Smokeless tobacco: Never   Substance Use Topics    Alcohol use: Not Currently     Comment: rarely                                Counseling given: Not Answered      Vital Signs (Current):   Vitals:    10/10/22 2107 10/10/22 2155 10/10/22 2200 10/10/22 2300   BP: 131/80 120/71 106/72 128/79   Pulse: (!) 102 90 88 83   Resp: 18 16 17 18   Temp: 37.1 °C (98.7 °F)      TempSrc: Oral      SpO2: 98%      Weight:       Height:                                                  BP Readings from Last 3 Encounters:   10/10/22 128/79   10/05/22 114/70   09/28/22 110/70       NPO Status:                                                                                 BMI:   Wt Readings from Last 3 Encounters:   10/10/22 186 lb (84.4 kg)   10/05/22 186 lb 14.4 oz (84.8 kg)   09/28/22 185 lb 4.8 oz (84.1 kg)     Body mass index is 25.94 kg/m². CBC:   Lab Results   Component Value Date/Time    WBC 13.9 10/10/2022 11:32 AM    RBC 4.18 10/10/2022 11:32 AM    HGB 11.7 10/10/2022 11:32 AM    HCT 35.0 10/10/2022 11:32 AM    MCV 83.7 10/10/2022 11:32 AM    RDW 13.5 10/10/2022 11:32 AM     10/10/2022 11:32 AM       CMP:   Lab Results   Component Value Date/Time    GLUCOSE 129 06/16/2022 05:09 AM       POC Tests: No results for input(s): POCGLU, POCNA, POCK, POCCL, POCBUN, POCHEMO, POCHCT in the last 72 hours.     Coags: No results found for: PROTIME, INR, APTT    HCG (If Applicable):   Lab Results   Component Value Date    PREGTESTUR positive 03/03/2022    HCG NEGATIVE 01/29/2021        ABGs: No results found for: PHART, PO2ART, NWO5TEJ, ZXF5YZT, BEART, A5HXCBDA     Type & Screen (If Applicable):  No results found for: LABABO, LABRH    Drug/Infectious Status (If Applicable):  Lab Results   Component Value Date/Time    HEPCAB NONREACTIVE 04/13/2022 12:15 PM       COVID-19 Screening (If Applicable): No results found for: COVID19        Anesthesia Evaluation  Patient summary reviewed history of anesthetic complications:   Airway: Mallampati: II  TM distance: >3 FB   Neck ROM: full  Mouth opening: > = 3 FB   Dental: normal exam         Pulmonary:Negative Pulmonary ROS and normal exam                               Cardiovascular:Negative CV ROS            Rhythm: regular  Rate: normal                    Neuro/Psych:   Negative Neuro/Psych ROS              GI/Hepatic/Renal: Neg GI/Hepatic/Renal ROS            Endo/Other: Negative Endo/Other ROS                    Abdominal:             Vascular: negative vascular ROS. Other Findings:           Anesthesia Plan      epidural     ASA 2             Anesthetic plan and risks discussed with patient.                         Anjali Montes, APRN - CRNA   10/10/2022

## 2022-10-11 NOTE — PROGRESS NOTES
Attending Nurse-Midiwfe Statement  I have discussed the care of Rafy Denis, including pertinent history and exam findings,  with the Student Nurse-Midwife. I have reviewed the key elements of all parts of the encounter with the Student Nurse-Midwife. I agree with the Assessment, Plan and Orders as documented by the Student Nurse-Midwife. [address] [place], [place] [address]s [facility] Labor Note    SUBJECTIVE:    Patient is resting comfortably with epidural. Family at the bedside providing support. OBJECTIVE:     VS:  height is 5' 11\" (1.803 m) and weight is 186 lb (84.4 kg). Her oral temperature is 98.6 °F (37 °C). Her blood pressure is 130/80 and her pulse is 89. Her respiration is 15 and oxygen saturation is 98%.      FHT:    Baseline: 130   Variability: moderate              Accels: present   Decels: Absent    Contraction pattern: 2-3 min, moderate, Pitocin 10mu/min                       Cervix:            Dilation: 6-7            Effacement: 90            Station: 0            Position: anterior            Consistency: soft    Status of membranes: intact    Pain control: epidural    Maternal status (hydration, fatigue, voids):   - IV fluids  - Resting comfortably, in good spirits     ASSESSMENT/PLAN:    Active Labor  - Anticipate vaginal birth    FHR Category 1

## 2022-10-11 NOTE — PROGRESS NOTES
[facility] Epidural Labor Note    Subjective:  Rested well with Nubain. Decided on epidural.   Now is comfortable. Objective:     VS:  height is 5' 11\" (1.803 m) and weight is 186 lb (84.4 kg). Her oral temperature is 98.7 °F (37.1 °C). Her blood pressure is 121/66 and her pulse is 85. Her respiration is 18 and oxygen saturation is 99%.      FHT:    Baseline: 115   Variability: moderate   Decels: Absent   Accels: Absent      Contraction pattern q2mins    Cx: 5 cm 90 soft 0  anterior     Assessment:    Normal labor progress  FHR Category 1    Plan:     Continue observation  Anticipate vaginal birth

## 2022-10-12 VITALS
BODY MASS INDEX: 26.04 KG/M2 | HEIGHT: 71 IN | WEIGHT: 186 LBS | TEMPERATURE: 98 F | RESPIRATION RATE: 16 BRPM | OXYGEN SATURATION: 97 % | HEART RATE: 62 BPM | DIASTOLIC BLOOD PRESSURE: 73 MMHG | SYSTOLIC BLOOD PRESSURE: 116 MMHG

## 2022-10-12 LAB
FETAL SCREEN: NORMAL
HCT VFR BLD CALC: 24.7 % (ref 36.3–47.1)
HEMOGLOBIN: 8.2 G/DL (ref 11.9–15.1)

## 2022-10-12 PROCEDURE — 36415 COLL VENOUS BLD VENIPUNCTURE: CPT

## 2022-10-12 PROCEDURE — 96372 THER/PROPH/DIAG INJ SC/IM: CPT

## 2022-10-12 PROCEDURE — 85018 HEMOGLOBIN: CPT

## 2022-10-12 PROCEDURE — 85461 HEMOGLOBIN FETAL: CPT

## 2022-10-12 PROCEDURE — 85014 HEMATOCRIT: CPT

## 2022-10-12 PROCEDURE — 6370000000 HC RX 637 (ALT 250 FOR IP): Performed by: ADVANCED PRACTICE MIDWIFE

## 2022-10-12 PROCEDURE — 6370000000 HC RX 637 (ALT 250 FOR IP)

## 2022-10-12 PROCEDURE — 99024 POSTOP FOLLOW-UP VISIT: CPT | Performed by: OBSTETRICS & GYNECOLOGY

## 2022-10-12 PROCEDURE — 6360000002 HC RX W HCPCS: Performed by: ADVANCED PRACTICE MIDWIFE

## 2022-10-12 RX ADMIN — ACETAMINOPHEN 1000 MG: 500 TABLET ORAL at 11:12

## 2022-10-12 RX ADMIN — DOCUSATE SODIUM 100 MG: 100 CAPSULE ORAL at 08:08

## 2022-10-12 RX ADMIN — HUMAN RHO(D) IMMUNE GLOBULIN 300 MCG: 300 INJECTION, SOLUTION INTRAMUSCULAR at 10:00

## 2022-10-12 RX ADMIN — IBUPROFEN 600 MG: 800 TABLET, FILM COATED ORAL at 04:54

## 2022-10-12 RX ADMIN — ACETAMINOPHEN 1000 MG: 500 TABLET ORAL at 04:54

## 2022-10-12 RX ADMIN — IBUPROFEN 600 MG: 800 TABLET, FILM COATED ORAL at 11:12

## 2022-10-12 ASSESSMENT — PAIN DESCRIPTION - LOCATION
LOCATION: ABDOMEN
LOCATION: PERINEUM

## 2022-10-12 ASSESSMENT — PAIN DESCRIPTION - PAIN TYPE: TYPE: ACUTE PAIN

## 2022-10-12 ASSESSMENT — PAIN SCALES - GENERAL
PAINLEVEL_OUTOF10: 3
PAINLEVEL_OUTOF10: 4
PAINLEVEL_OUTOF10: 4

## 2022-10-12 ASSESSMENT — PAIN DESCRIPTION - DESCRIPTORS
DESCRIPTORS: CRAMPING
DESCRIPTORS: SORE

## 2022-10-12 ASSESSMENT — PAIN DESCRIPTION - FREQUENCY: FREQUENCY: INTERMITTENT

## 2022-10-12 NOTE — PROGRESS NOTES
POST PARTUM DAY # 1    Lory Loja is a 23 y.o. female  This patient was seen & examined today. 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 Her lochia is light. She denies chest pain, shortness of breath, headache, lightheadedness and blurred vision. She is breast feeding and she denies any breast tenderness. She is ambulating well. Her voiding pattern is normal. I reviewed signs and symptoms of post partum depression with the patient, she currently denies any of these symptoms. She is tolerating solids.      Vital Signs:  Vitals:    10/11/22 0922 10/11/22 1300 10/11/22 1605 10/11/22 2247   BP: 124/82 116/71 131/87 130/78   Pulse: 78 67 62 81   Resp: 16 16 16 16   Temp: 98.3 °F (36.8 °C) 98.2 °F (36.8 °C) 98.5 °F (36.9 °C) 98.8 °F (37.1 °C)   TempSrc: Oral Oral Oral Oral   SpO2: 98%   100%   Weight:       Height:             Physical Exam:  General:  no apparent distress, alert and cooperative  Neurologic:  alert, oriented, normal speech, no focal findings or movement disorder noted  Lungs:  No increased work of breathing  Heart:  regular rate and rhythm  Abdomen: abdomen soft, non-distended, non-tender  Fundus: non-tender, firm, below umbilicus  Extremities:  no calf tenderness, non edematous    Lab:  Lab Results   Component Value Date    HGB 11.7 (L) 10/10/2022     Lab Results   Component Value Date    HCT 35.0 (L) 10/10/2022 Assessment/Plan:  Sury Felipe is a  PPD # 1 s/p    - Doing well, VSS   - male infant in [address]   - Encourage ambulation   - Postpartum Hgb/Hct if sx   -Due to heavy bleeding immediately following delivery she received IM Methergine and Hemabate. Bleeding has remained stable and she has no signs or symptoms of anemia  Rh negative/Rubella immune   -Rhogam indicated and to be given PP  Breast feeding   - Denies s/s of mastitis   - Hbg   THC Use   -Cessation encouraged   -Social work consulted and there were no concerns   Continue post partum care    Counseling Completed:  Secondary Smoke risks and Sudden Infant Death Syndrome were reviewed with recommendations. Infant sleeping, \"back to sleep\" and avoidance of co-sleeping recommendations were reviewed. Signs and Symptoms of Post Partum Depression were reviewed. The patient is to call if any occur. Signs and symptoms of Mastitis were reviewed. The patient is to call if any occur for follow up. Discharge instructions including pelvic rest, no driving with pain medicine and office follow-up were reviewed with patient     Attending Physician: Dr. Matthew Bar, DO  Ob/Gyn Resident   10/12/2022, 2:42 AM        Attending Physician Statement  I have discussed the care of Sury Felipe, including pertinent history and exam findings,  with the resident. I have reviewed the key elements of all parts of the encounter with the resident. I agree with the assessment, plan and orders as documented by the resident.   (GE Modifier)    Electronically signed by Sisi Mccabe MD at 8:36 AM 10/12/22

## 2022-10-12 NOTE — PLAN OF CARE
Problem: Vaginal Birth or  Section  Goal: Fetal and maternal status remain reassuring during the birth process  Description:  Birth OB-Pregnancy care plan goal which identifies if the fetal and maternal status remain reassuring during the birth process  10/12/2022 1511 by Tonio Us RN  Outcome: Adequate for Discharge  10/12/2022 0235 by Gunnar Hill RN  Outcome: Progressing     Problem: Postpartum  Goal: Experiences normal postpartum course  Description:  Postpartum OB-Pregnancy care plan goal which identifies if the mother is experiencing a normal postpartum course  10/12/2022 1511 by Tonio Us RN  Outcome: Adequate for Discharge  10/12/2022 0235 by Gunnar Hill RN  Outcome: Progressing  Goal: Appropriate maternal -  bonding  Description:  Postpartum OB-Pregnancy care plan goal which identifies if the mother and  are bonding appropriately  10/12/2022 1511 by Tonio Us RN  Outcome: Adequate for Discharge  10/12/2022 0235 by Gunnar Hill RN  Outcome: Progressing  Goal: Establishment of infant feeding pattern  Description:  Postpartum OB-Pregnancy care plan goal which identifies if the mother is establishing a feeding pattern with their   10/12/2022 1511 by Tonio Us RN  Outcome: Adequate for Discharge  10/12/2022 0235 by Gunnar Hill RN  Outcome: Progressing  Goal: Incisions, wounds, or drain sites healing without S/S of infection  10/12/2022 1511 by Tonio Us RN  Outcome: Adequate for Discharge  10/12/2022 0235 by Gunnar Hill RN  Outcome: Progressing     Problem: Pain  Goal: Verbalizes/displays adequate comfort level or baseline comfort level  10/12/2022 1511 by Tonio Us RN  Outcome: Adequate for Discharge  10/12/2022 0235 by Gunnar Hill RN  Outcome: Progressing     Problem: Infection - Adult  Goal: Absence of infection at discharge  10/12/2022 1511 by Tonio Us RN  Outcome: Adequate for Discharge  10/12/2022 0235 by Tete Kwan RN  Outcome: Progressing  Goal: Absence of infection during hospitalization  10/12/2022 1511 by Debbi Cheatham RN  Outcome: Adequate for Discharge  10/12/2022 0235 by Tete Kwan RN  Outcome: Progressing  Goal: Absence of fever/infection during anticipated neutropenic period  10/12/2022 1511 by Debbi Cheatham RN  Outcome: Adequate for Discharge  10/12/2022 0235 by Tete Kwan RN  Outcome: Progressing     Problem: Safety - Adult  Goal: Free from fall injury  10/12/2022 1511 by Debbi Cheatham RN  Outcome: Adequate for Discharge  10/12/2022 0235 by Tete Kwan RN  Outcome: Progressing     Problem: Discharge Planning  Goal: Discharge to home or other facility with appropriate resources  10/12/2022 1511 by Debbi Cheatham RN  Outcome: Adequate for Discharge  10/12/2022 0235 by Tete Kwan RN  Outcome: Progressing     Problem: Chronic Conditions and Co-morbidities  Goal: Patient's chronic conditions and co-morbidity symptoms are monitored and maintained or improved  10/12/2022 1511 by Debbi Cheatham RN  Outcome: Adequate for Discharge  10/12/2022 0235 by Tete Kwan RN  Outcome: Progressing

## 2022-10-12 NOTE — PLAN OF CARE
Problem: Vaginal Birth or  Section  Goal: Fetal and maternal status remain reassuring during the birth process  Description:  Birth OB-Pregnancy care plan goal which identifies if the fetal and maternal status remain reassuring during the birth process  10/12/2022 0235 by Selvin Campbell RN  Outcome: Progressing  10/11/2022 1804 by Tonia Gonzalez RN  Outcome: Progressing  10/11/2022 171 by Tonia Gonzalez RN  Outcome: Progressing     Problem: Postpartum  Goal: Experiences normal postpartum course  Description:  Postpartum OB-Pregnancy care plan goal which identifies if the mother is experiencing a normal postpartum course  10/12/2022 0235 by Selvin Campbell RN  Outcome: Progressing  10/11/2022 1804 by Tonia Gonzalez RN  Outcome: Progressing  10/11/2022 171 by Tonia Gonzalez RN  Outcome: Progressing  Goal: Appropriate maternal -  bonding  Description:  Postpartum OB-Pregnancy care plan goal which identifies if the mother and  are bonding appropriately  10/12/2022 0235 by Selvin Campbell RN  Outcome: Progressing  10/11/2022 1804 by Tonia Gonzalez RN  Outcome: Progressing  10/11/2022 1716 by Tonia Gonzalez RN  Outcome: Progressing  Goal: Establishment of infant feeding pattern  Description:  Postpartum OB-Pregnancy care plan goal which identifies if the mother is establishing a feeding pattern with their   10/12/2022 0235 by Selvin Campbell RN  Outcome: Progressing  10/11/2022 1804 by Tonia Gonzalez RN  Outcome: Progressing  10/11/2022 171 by Tonia Gonzalez RN  Outcome: Progressing  Goal: Incisions, wounds, or drain sites healing without S/S of infection  10/12/2022 0235 by Selvin Campbell RN  Outcome: Progressing  10/11/2022 1804 by Tonia Gonzalez RN  Outcome: Progressing  10/11/2022 1716 by Tonia Gonzalez RN  Outcome: Progressing     Problem: Pain  Goal: Verbalizes/displays adequate comfort level or baseline comfort level  10/12/2022 0235 by Selvin Campbell RN  Outcome: Progressing  10/11/2022 1804 by Tricia Wynn RN  Outcome: Progressing  10/11/2022 1716 by Tricia Wynn RN  Outcome: Progressing     Problem: Infection - Adult  Goal: Absence of infection at discharge  10/12/2022 0235 by Gunnar Hill RN  Outcome: Progressing  10/11/2022 1804 by Tricia Wynn RN  Outcome: Progressing  10/11/2022 1716 by Tricia Wynn RN  Outcome: Progressing  Goal: Absence of infection during hospitalization  10/12/2022 0235 by Gunanr Hill RN  Outcome: Progressing  10/11/2022 1804 by Tricia Wynn RN  Outcome: Progressing  10/11/2022 1716 by Tricia Wynn RN  Outcome: Progressing  Goal: Absence of fever/infection during anticipated neutropenic period  10/12/2022 0235 by Gunnar Hill RN  Outcome: Progressing  10/11/2022 1804 by Tricia Wynn RN  Outcome: Progressing  10/11/2022 1716 by Tricia Wynn RN  Outcome: Progressing     Problem: Safety - Adult  Goal: Free from fall injury  10/12/2022 0235 by Gunnar Hill RN  Outcome: Progressing  10/11/2022 1804 by Tricia Wynn RN  Outcome: Progressing  10/11/2022 1716 by Tricia Wynn RN  Outcome: Progressing     Problem: Discharge Planning  Goal: Discharge to home or other facility with appropriate resources  10/12/2022 0235 by Gunnar Hill RN  Outcome: Progressing  10/11/2022 1804 by Tricia Wynn RN  Outcome: Progressing  10/11/2022 1716 by Tricia Wynn RN  Outcome: Progressing     Problem: Chronic Conditions and Co-morbidities  Goal: Patient's chronic conditions and co-morbidity symptoms are monitored and maintained or improved  10/12/2022 0235 by Gunnar Hill RN  Outcome: Progressing  10/11/2022 1804 by Tricia Wynn RN  Outcome: Progressing  10/11/2022 1716 by Tricia Wynn RN  Outcome: Progressing

## 2022-10-12 NOTE — LACTATION NOTE
This note was copied from a baby's chart. RN in room to assist with lactation. Mom tells RN that baby has not eaten since 1000 - baby has been sleepy most of the day. RN stripped baby down to obtain vials and did check a blood sugar since it has been so long since baby has eaten - blood sugar 45. RN told parents how to wake baby and gave dad some tips on how to keep baby awake while at breast. Before latching baby RN taught mom how to hand express colostrum. Many drops visible and were dropped into baby's mouth. Baby awake and showing hunger cues. Baby placed into football hold and mom instructed on how to hold baby and how to ensure baby is latched deeply. Mom complains of no pain. RN offers assistance with lactation if needed.

## 2022-10-12 NOTE — FLOWSHEET NOTE
I have reviewed all [facility] Post-Birth Warning Signs and essential teaching points for pulmonary embolism, cardiac disease, hypertensive disorders of pregnancy, obstetric hemorrhage, venous thromboembolism, infection, and postpartum depression with the patient and FOB (support person) . I have informed the patient on when to call their healthcare provider and when to call 911. I have discussed with the patient  the importance of scheduling a follow-up visit with their physician, nurse practitioner or midwife and provided them with correct contact information for appointment. I have provided the patient with a copy of the \"Save Your Life\" handout. The patient has acknowledged receiving and understanding this education with her signature. Discharge instructions and follow up appointments reviewed.

## 2022-10-12 NOTE — LACTATION NOTE
Mom states breastfeeding is going well. Baby circumcised this morning, currently swaddled and sleepy in mom's arms. Discussed post circ behavior and benefits of STS. Reviewed discharge instructions and LC follow up. Education booklet given.

## 2022-10-13 LAB
BLD PROD TYP BPU: NORMAL
STATUS OF UNITS: NORMAL
TRANSFUSION STATUS: NORMAL
UNIT DIVISION: 0
UNIT NUMBER: NORMAL

## 2022-10-24 NOTE — PROGRESS NOTES
HPI:  DELIVERY DATE: 10/11/22  TYPE OF DELIVERY: normal spontaneous vaginal delivery  PROVIDER: Vi JOHNSON  PERINEUM: 2nd degree lac/repair    Altagracia was seen for her 2 week visit. Her Male infant is healthy. She is breast feeding. She is breastfeeding without difficulty. She is not experiencing pain. She reports her lochia as light  She reports minimal perineal discomfort. She denies urinary incontinence but is experiencing new-onset dysuria  Her bowels have returned to her normal pattern. She is not back to her normal activity pattern. Morgan Costa has not engaged in intercourse. She does not report a mood disorder. She feels she is not having difficulty coping, she does have help  Morgan Costa feels her family adjustment is effective. Morgan Costa relates she is unhappy with her birth experience. Her mother also expresses discontent with labor and birth management. Morgan Costa was teary as she discussed her birth experience, feels she was not supported through her labor or birth. Expresses concerns on management as well as excessive bleeding she experienced. OBJECTIVE:   Wt Readings from Last 3 Encounters:   10/25/22 186 lb (84.4 kg)   10/10/22 186 lb (84.4 kg)   10/05/22 186 lb 14.4 oz (84.8 kg)       Blood pressure 108/72, pulse 97, height 5' 11\" (1.803 m), weight 186 lb (84.4 kg), last menstrual period 12/27/2021, currently breastfeeding. Postpartum Depression: Low Risk     Last EPDS Total Score: 2    Last EPDS Self Harm Result: Never        ASSESSMENT/PLAN:  2  week postpartum visit  - Labs were not ordered. - Return to the office in 4 weeks. - She will return for 6 week PP visit  Breastfeeding  Signs & Symptoms of mastitis reviewed; notify if occurs  Post Partum PTSD  - Listened to Morgan Costa and mother voice their concerns with labor and birth experience. Encouraged Morgan Costa to write down her experience in order to continue to process.   Discussed as well that she may need counseling to help with PTSD. Discussed that they may want to review birth with provider to have any further concerns addressed and can schedule if they desire. Postpartum Anemia (11.7>8.2)  - Advise she needed to continue iron supplementation  - Will check CBC at 6 wk PP visit  Dysuria  - Continue pushing fluids  - Await urine culture results, treat as needed  Family planning/birth spacing needs  Family planning counseling was initiated, undecided and continue discussion NOV  Non-smoker  Secondary smoke risks were reviewed, including increased risks of respiratory     problems, Sudden Infant Death Syndrome, and potential malignancies.       The patient, Riya Enriquez,  was seen with a total time spent of 30 minutes for the visit on this date of service by the [facility]  On this date October 25, 2022,  I have spent greater than 50% of this visit:  [x] reviewing previous notes  [x] reviewing test results  [x] pre-charting  [] performing necessary physical exam  Discussed with patient:  [x] Importance of compliance with treatment plan  [x] Counseling  [x] Coordinating care  [x] Documenting

## 2022-10-25 ENCOUNTER — HOSPITAL ENCOUNTER (OUTPATIENT)
Age: 22
Setting detail: SPECIMEN
Discharge: HOME | End: 2022-10-25

## 2022-10-25 ENCOUNTER — POSTPARTUM VISIT (OUTPATIENT)
Dept: OBGYN CLINIC | Age: 22
End: 2022-10-25

## 2022-10-25 VITALS
HEART RATE: 97 BPM | HEIGHT: 71 IN | BODY MASS INDEX: 26.04 KG/M2 | WEIGHT: 186 LBS | SYSTOLIC BLOOD PRESSURE: 108 MMHG | DIASTOLIC BLOOD PRESSURE: 72 MMHG

## 2022-10-25 DIAGNOSIS — R30.0 DYSURIA: ICD-10-CM

## 2022-10-25 PROBLEM — O26.12 POOR WEIGHT GAIN OF PREGNANCY, SECOND TRIMESTER: Status: RESOLVED | Noted: 2022-05-19 | Resolved: 2022-10-25

## 2022-10-25 PROBLEM — Z34.00 SUPERVISION OF NORMAL FIRST PREGNANCY, ANTEPARTUM: Status: RESOLVED | Noted: 2022-03-03 | Resolved: 2022-10-25

## 2022-10-25 PROBLEM — Z67.91 RH NEGATIVE STATE IN ANTEPARTUM PERIOD: Status: RESOLVED | Noted: 2022-04-14 | Resolved: 2022-10-25

## 2022-10-25 PROBLEM — Z28.21 COVID-19 VACCINATION DECLINED: Status: RESOLVED | Noted: 2022-08-03 | Resolved: 2022-10-25

## 2022-10-25 PROBLEM — O99.019 ANEMIA, ANTEPARTUM: Status: RESOLVED | Noted: 2022-04-14 | Resolved: 2022-10-25

## 2022-10-25 PROBLEM — O09.93 HIGH-RISK PREGNANCY IN THIRD TRIMESTER: Status: RESOLVED | Noted: 2022-10-11 | Resolved: 2022-10-25

## 2022-10-25 PROBLEM — O26.899 RH NEGATIVE STATE IN ANTEPARTUM PERIOD: Status: RESOLVED | Noted: 2022-04-14 | Resolved: 2022-10-25

## 2022-10-25 PROBLEM — Z28.21 TETANUS, DIPHTHERIA, AND ACELLULAR PERTUSSIS (TDAP) VACCINATION DECLINED: Status: RESOLVED | Noted: 2022-06-16 | Resolved: 2022-10-25

## 2022-10-25 PROBLEM — R82.5 POSITIVE URINE DRUG SCREEN: Status: RESOLVED | Noted: 2022-03-04 | Resolved: 2022-10-25

## 2022-10-25 PROCEDURE — 99024 POSTOP FOLLOW-UP VISIT: CPT | Performed by: ADVANCED PRACTICE MIDWIFE

## 2022-10-26 LAB
CULTURE: NORMAL
SPECIMEN DESCRIPTION: NORMAL

## 2022-11-16 ENCOUNTER — POSTPARTUM VISIT (OUTPATIENT)
Dept: OBGYN CLINIC | Age: 22
End: 2022-11-16
Payer: MEDICAID

## 2022-11-16 VITALS
HEART RATE: 79 BPM | SYSTOLIC BLOOD PRESSURE: 114 MMHG | BODY MASS INDEX: 23.15 KG/M2 | WEIGHT: 166 LBS | DIASTOLIC BLOOD PRESSURE: 63 MMHG

## 2022-11-16 DIAGNOSIS — R10.2 PERINEAL PAIN IN FEMALE: Primary | ICD-10-CM

## 2022-11-16 PROCEDURE — 99212 OFFICE O/P EST SF 10 MIN: CPT

## 2022-11-16 NOTE — PROGRESS NOTES
[address] [facility] AND GYNECOLOGY  [place] [address] [address] [address] [facility] [address]  Dept: [phone]    Patient Name: Ladonna Hopson  Patient Age: 23 y. o. Date of Visit: 2022    Subjective  Chief Complaint   Patient presents with    Gynecologic Exam     Patient's last menstrual period was 2021. HPI    Ladonna Hopson is an established patient. Patient presents with concerns about proper healing of perineal repair. Having burning when urine touches the area. Sutures appear red. Admits to using the daniel bottle with urination. OB History    Para Term  AB Living   1 1 1 0 0 1   SAB IAB Ectopic Molar Multiple Live Births   0 0 0 0 0 1      # Outcome Date GA Lbr Greg/2nd Weight Sex Delivery Anes PTL Lv   1 Term 10/11/22 41w1d  9 lb 8.2 oz (4.315 kg) M Vag-Spont EPI, Nitrous Oxid N TRAY      Complications: Meconium at birth      Name: Lynette Hogan: 8  Apgar5: 9     Past Medical History:   Diagnosis Date    Family history of uterine cancer 2019    MGMA- dx in 30s-40s MyRisk screening initiated 2019    Irregular menses                                                                    Past Surgical History:   Procedure Laterality Date    INTRAUTERINE DEVICE INSERTION  2019    Mirena.  Lot [id number]  Exp 2020    INTRAUTERINE DEVICE REMOVAL  10/26/2020    TONSILLECTOMY AND ADENOIDECTOMY  age 1     Family History   Problem Relation Age of Onset    Other Maternal Grandmother         lung cancer, smoker    Cancer Maternal Grandmother         uterine cancer, 35s to 45s     Social History     Socioeconomic History    Marital status: Single     Spouse name: Not on file    Number of children: Not on file    Years of education: Not on file    Highest education level: Not on file   Occupational History    Not on file   Tobacco Use    Smoking status: Never    Smokeless tobacco: Never Vaping Use    Vaping Use: Never used   Substance and Sexual Activity    Alcohol use: Not Currently     Comment: rarely    Drug use: Yes     Types: Marijuana Sweetser Calamity)     Comment: stopped 07/06/2022    Sexual activity: Yes     Partners: Male     Birth control/protection: Pill   Other Topics Concern    Not on file   Social History Narrative    Not on file     Social Determinants of Health     Financial Resource Strain: Not on file   Food Insecurity: Not on file   Transportation Needs: Not on file   Physical Activity: Not on file   Stress: Not on file   Social Connections: Not on file   Intimate Partner Violence: Not on file   Housing Stability: Not on file         MEDICATIONS:  Current Outpatient Medications   Medication Sig Dispense Refill    ibuprofen (ADVIL;MOTRIN) 800 MG tablet Take 1 tablet by mouth every 8 hours as needed for Pain 60 tablet 1    docusate sodium (COLACE) 100 MG capsule Take 1 capsule by mouth 2 times daily as needed for Constipation 60 capsule 0    Prenatal Vit-Fe Fumarate-FA (PRENATAL VITAMIN) 27-0.8 MG TABS Take 1 tablet by mouth daily 30 tablet 11    FEROSUL 325 (65 Fe) MG tablet TAKE 1 TABLET BY MOUTH TWICE DAILY (Patient not taking: No sig reported) 60 tablet 5     No current facility-administered medications for this visit. ALLERGIES:  Allergies as of 11/16/2022    (No Known Allergies)       Review of Systems:  Constitutional: Negative for chills, fever, fatigue. Respiratory: Negative for shortness of breath, cough, and wheezing  Cardiovascular: Negative for chest pain and palpitations. Gastrointestinal: Negative for abdominal pain, diarrhea, nausea, and vomiting  Genitourinary: Negative for dysuria, frequency, and urgency. Positive for pain around repair   Musculoskeletal: Negative for back pain and joint swelling. Skin: Negative for rash or wound.   Neurological: Negative for dizziness, light-headedness and headaches  Psychiatric: Negative for dysphoric mood and sleep disturbance. The patient is not nervous/anxious      Objective  /63   Pulse 79   Wt 166 lb (75.3 kg)   LMP 12/27/2021   Breastfeeding Yes   BMI 23.15 kg/m²     Physical Exam  Constitutional:       General: She is not in acute distress. Appearance: Normal appearance. She is normal weight. She is not ill-appearing. HENT:      Head: Normocephalic. Cardiovascular:      Rate and Rhythm: Normal rate and regular rhythm. Pulses: Normal pulses. Heart sounds: Normal heart sounds. Pulmonary:      Effort: Pulmonary effort is normal. No respiratory distress. Breath sounds: Normal breath sounds. No wheezing. Abdominal:      General: Abdomen is flat. Bowel sounds are normal. There is no distension. Palpations: Abdomen is soft. Tenderness: There is no abdominal tenderness. There is no right CVA tenderness, left CVA tenderness or guarding. Genitourinary:     Labia:         Right: No rash, tenderness or lesion. Left: No rash, tenderness or lesion. Comments: Escar tissue noted on perineum near repair. Musculoskeletal:         General: No swelling or tenderness. Normal range of motion. Cervical back: Normal range of motion and neck supple. No rigidity. Right lower leg: No edema. Left lower leg: No edema. Lymphadenopathy:      Cervical: No cervical adenopathy. Skin:     General: Skin is warm and dry. Coloration: Skin is not jaundiced. Neurological:      General: No focal deficit present. Mental Status: She is alert and oriented to person, place, and time. Mental status is at baseline. Psychiatric:         Mood and Affect: Mood normal.         Behavior: Behavior normal.         Thought Content: Thought content normal.         Judgment: Judgment normal.         Assessment & Plan  1. Perineal pain in female  - Discussed removal of the escar tissue. - Appointment next week for 6 PP visit with Bao Saavedra CNM, to remove at that time. The patient, Riya Enriquez , was seen with a total time spent of 20 minutes for the visit on this date of service by the [facility]  Both face-to-face (counseling and education) and non face-to-face time (care coordination), were spent in determining the total time component. Return if symptoms worsen or fail to improve, for 6 wk PP visit.     Electronically Signed YADIRA Mcfadden CNM

## 2022-11-21 ENCOUNTER — POSTPARTUM VISIT (OUTPATIENT)
Dept: OBGYN CLINIC | Age: 22
End: 2022-11-21
Payer: MEDICAID

## 2022-11-21 VITALS
DIASTOLIC BLOOD PRESSURE: 70 MMHG | SYSTOLIC BLOOD PRESSURE: 112 MMHG | WEIGHT: 165 LBS | BODY MASS INDEX: 23.1 KG/M2 | HEIGHT: 71 IN

## 2022-11-21 DIAGNOSIS — T14.8XXD DELAYED WOUND HEALING: ICD-10-CM

## 2022-11-21 DIAGNOSIS — R10.2 POSTPARTUM PERINEAL PAIN: Primary | ICD-10-CM

## 2022-11-21 NOTE — PROGRESS NOTES
[address] [facility] AND GYNECOLOGY  [address] [place] [address].   [address] [place] [id number]  Dept: [phone]   Patient Name: Rosena Bence  Patient : 2000  MRN #: [id number]  [facility] #: [id number]    Date: 2022  Time: 12:53 PM      The patient was seen today requesting removal of her escar tissue from her laceration repair. She has 1 cm of unhealed tissue at the site of her vaginal delivery laceration repair. Midline on her perineum. Escar noted on the maternal right side of laceration. Wound bed is bright beefy red without drainage. Pt reports it is painful with urination and will occasionally get spots of blood in her underwear. She is 6 weeks postpartum. She was offered conservative medical management. She was counseled on the risk and complications of the procedure; bleeding, infection, scarring, painful intercourse, and re-occurrence. She was given  all conservative options. The patient signed the consent form. /70 (Site: Left Upper Arm, Position: Sitting, Cuff Size: Medium Adult)   Ht 5' 11\" (1.803 m)   Wt 165 lb (74.8 kg)   LMP 2021   Breastfeeding Yes   BMI 23.01 kg/m²     Chaperone for Intimate Exam  Chaperone was offered as part of the rooming process. Patient declined and agrees to continue with exam without a chaperone. A time out was called by the Chaperone listed above while ALL participants were quiet and attentive. The procedure to be completed was confirmed, with the appropriate laterality demarcated prior, if appropriate, as well as the patients name and a unique identifier, her date of birth. All questions were answered to her satisfaction. The consent was signed prior to the time out and all the risks were discussed in detail. PROCEDURE NOTE:  The patient was sterilely prepped and draped.  1% Lidocaine without epinephrine was used for local anesthetic 1 ml total.  The lesion were excised with tweezers and scissors. Silver nitrate was used for hemostasis. Proper hygiene was reviewed. An antibiotic was not  given. The patient tolerated the procedure well. Assessment:   Diagnosis Orders   1. Postpartum perineal pain        2. Delayed wound healing            Plan:  She is to return for an office visit in 1 week. She is to report any temp more than 100 F , Pain or redness. She is to refrain from intercourse douching or tampons. No baths lakes or pools.   RTO in 1 weeks  Restrictions Reviewed

## 2022-11-21 NOTE — PROGRESS NOTES
Pt is here for 6 wk pp visit with tissue removal . Pt states 3-4 pain with urination.   Pt would like to wait on [facility]     EPDS-0

## 2022-12-06 ENCOUNTER — POSTPARTUM VISIT (OUTPATIENT)
Dept: OBGYN CLINIC | Age: 22
End: 2022-12-06
Payer: MEDICAID

## 2022-12-06 ENCOUNTER — HOSPITAL ENCOUNTER (OUTPATIENT)
Age: 22
Setting detail: SPECIMEN
Discharge: HOME | End: 2022-12-06

## 2022-12-06 VITALS
WEIGHT: 166 LBS | HEIGHT: 71 IN | SYSTOLIC BLOOD PRESSURE: 112 MMHG | DIASTOLIC BLOOD PRESSURE: 68 MMHG | BODY MASS INDEX: 23.24 KG/M2

## 2022-12-06 DIAGNOSIS — T14.8XXD DELAYED WOUND HEALING: ICD-10-CM

## 2022-12-06 PROBLEM — Z34.90 ENCOUNTER FOR INDUCTION OF LABOR: Status: RESOLVED | Noted: 2022-10-10 | Resolved: 2022-12-06

## 2022-12-06 PROBLEM — A56.8 CHLAMYDIA TRACHOMATIS INFECTION: Status: RESOLVED | Noted: 2021-04-22 | Resolved: 2022-12-06

## 2022-12-06 PROCEDURE — 99213 OFFICE O/P EST LOW 20 MIN: CPT | Performed by: ADVANCED PRACTICE MIDWIFE

## 2022-12-06 NOTE — PROGRESS NOTES
Patient Name: Sommer Houston  Patient : 2000  MRN #: [id number]  [facility] #: [id number]  [address] Naida Libman [address] [place] [address]  Dept: [phone]   Date: 2022  Time: 4:25 PM      Chief Complaint   Patient presents with    Postpartum Care       Subjective  HPI:  DELIVERY DATE: 10/11/11  TYPE OF DELIVERY: normal spontaneous vaginal delivery  PROVIDER: kathryn adair  PERINEUM: 2nd degree    She had eschar tissue removed from her laceration repair about a week ago and is feeling much better since. Elena Clifton was seen for her 6 week visit. Her Male infant is healthy. She is breast feeding. She is breastfeeding without difficulty. She is experiencing pain. She is rating her pain   She reports her lochia is no bleeding. Period started 22  She reports none perineal discomfort. She denies urinary incontinence. Her bowels have returned to her normal pattern. She is back to her normal activity pattern. She has her first menses. She does have good support at home. She has been sleeping well. Elena Clifton has not engaged in intercourse. She does not report a mood disorder. She feels she is not having difficulty coping. Elena Clifton feels her family adjustment is effective. She reports an overall positive birth experience. Her [place] Score is 0. This score does match my assessment. PHQ Scores 2019   PHQ2 Score 0 0 0 0   PHQ9 Score 0 0 0 0     Interpretation of Total Score Depression Severity: 1-4 = Minimal depression, 5-9 = Mild depression, 10-14 = Moderate depression, 15-19 = Moderately severe depression, 20-27 = Severe depression  JOVANNI 7 SCORE 2021   JOVANNI-7 Total Score 0     Interpretation of JOVANNI-7 score: 5-9 = mild anxiety, 10-14 = moderate anxiety, 15+ = severe anxiety.  Recommend referral to behavioral health for scores 10 or greater. Postpartum Depression: Low Risk     Last EPDS Total Score: 2    Last EPDS Self Harm Result: Never        Past Medical History:   Diagnosis Date    Family history of uterine cancer 11/27/2019    MGMA- dx in 30s-40s MyRisk screening initiated 11/27/2019    Hx of Chlamydia  infection (2021) 4/22/2021 4/22/2021 tx sent, TOR in 3 months: 6/9/2021 negative     Irregular menses       Past Surgical History:   Procedure Laterality Date    INTRAUTERINE DEVICE INSERTION  12/24/2019    Mirena.  Lot [id number]  Exp 03/2020    INTRAUTERINE DEVICE REMOVAL  10/26/2020    TONSILLECTOMY AND ADENOIDECTOMY  age 1      Social History     Socioeconomic History    Marital status: Single     Spouse name: Not on file    Number of children: Not on file    Years of education: Not on file    Highest education level: Not on file   Occupational History    Not on file   Tobacco Use    Smoking status: Never    Smokeless tobacco: Never   Vaping Use    Vaping Use: Never used   Substance and Sexual Activity    Alcohol use: Not Currently     Comment: rarely    Drug use: Yes     Types: Marijuana (Juliet Pérez)     Comment: stopped 07/06/2022    Sexual activity: Yes     Partners: Male     Birth control/protection: Pill   Other Topics Concern    Not on file   Social History Narrative    Not on file     Social Determinants of Health     Financial Resource Strain: Not on file   Food Insecurity: Not on file   Transportation Needs: Not on file   Physical Activity: Not on file   Stress: Not on file   Social Connections: Not on file   Intimate Partner Violence: Not on file   Housing Stability: Not on file      No Known Allergies       Current Outpatient Medications   Medication Sig Dispense Refill    ibuprofen (ADVIL;MOTRIN) 800 MG tablet Take 1 tablet by mouth every 8 hours as needed for Pain 60 tablet 1    docusate sodium (COLACE) 100 MG capsule Take 1 capsule by mouth 2 times daily as needed for Constipation 60 capsule 0    FEROSUL 325 (65 Fe) MG tablet TAKE 1 TABLET BY MOUTH TWICE DAILY 60 tablet 5    Prenatal Vit-Fe Fumarate-FA (PRENATAL VITAMIN) 27-0.8 MG TABS Take 1 tablet by mouth daily 30 tablet 11     No current facility-administered medications for this visit. OB History    Para Term  AB Living   1 1 1     1   SAB IAB Ectopic Molar Multiple Live Births           0 1      # Outcome Date GA Lbr Greg/2nd Weight Sex Delivery Anes PTL Lv   1 Term 10/11/22 41w1d  9 lb 8.2 oz (4.315 kg) M Vag-Spont EPI, Nitrous Oxid N TRAY      Complications: Meconium at birth     Preventive Health Screening:   Date of last pap:  normal  Cytology Report   Date Value Ref Range Status   2022       INTERPRETATION    Cervical material, (ThinPrep vial, Imaging-assisted review):  Specimen Adequacy:       Satisfactory for evaluation.       -Endocervical/transformation zone component is absent. Descriptive Diagnosis:       Negative for intraepithelial lesion or malignancy. Cytotechnologist:   Andreas KELLY(ASCP)  **Electronically Signed Out**  /3/28/2022          Source:  A: Cervical material, (ThinPrep vial, Imaging-assisted review)    Clinical History  Pregnant: 12 weeks  High risk HPV DNA testing is requested if the diagnosis is abnormal  LMP:  2021  GYNECOLOGIC CYTOLOGY REPORT    Patient Name: Kvng Gallo: [id number]  Path Number: [id number]  [facility] [address]   [facility],  [address]  [phone]  Fax: [phone]             Review of Systems: all 10 other organ systems reviewed and are negative unless otherwise noted in HPI    OBJECTIVE:   Wt Readings from Last 3 Encounters:   22 166 lb (75.3 kg)   22 165 lb (74.8 kg)   22 166 lb (75.3 kg)       /68 (Site: Left Upper Arm, Position: Sitting, Cuff Size: Medium Adult)   Ht 5' 11\" (1.803 m)   Wt 166 lb (75.3 kg)   LMP 2021   Breastfeeding Yes   BMI 23.15 kg/m²     General:  appears alert, oriented and cooperative. Afebrile. Skin: Normal in appearance, texture, and temperature; No lesions    Breast exam: declined    Abdomen: Soft non-tender without guarding. There is diastasis present. The diastasis is 1 finger breaths of separation. Perineum: healing with good reapproximation, eschar removal healing    Extremities: No calf tenderness bilaterally. No edema. Psychiatric: normal mood, well kept, good hygiene, adjusting well to motherhood. ASSESSMENT/PLAN:      Visit Diagnoses         Codes    Encounter for postpartum visit    -  Primary Z39.2    Delayed wound healing     T14. 8XXD          Visit Diagnoses         Codes    Encounter for postpartum visit    -  Primary Z39.2    Delayed wound healing     T14. 8XXD          Orders Placed This Encounter   Procedures    CBC     Standing Status:   Future     Standing Expiration Date:   12/7/2023         6 week postpartum visit  She will return for 1 year  Labs were ordered. breast feeding  Signs & Symptoms of mastitis reviewed; notify if occurs    Family planning needs  Family planning counseling was completed. She plans nothing. The patient, Jian Serrano,  was seen with a total time spent of 30 minutes for the visit on this date of service by the [facility]  The time component, involved both face-to-face (counseling and education)  and non face-to-face time (care coordination), spent in determining the total time component.       Education   - pregnancy spacing   - postpartum mood disorders can occur anytime in the first year after giving birth   - mastitis and plugged milk ducts

## 2022-12-07 LAB
HCT VFR BLD CALC: 42.8 % (ref 36.3–47.1)
HEMOGLOBIN: 12.9 G/DL (ref 11.9–15.1)
MCH RBC QN AUTO: 26.4 PG (ref 25.2–33.5)
MCHC RBC AUTO-ENTMCNC: 30.1 G/DL (ref 28.4–34.8)
MCV RBC AUTO: 87.5 FL (ref 82.6–102.9)
NRBC AUTOMATED: 0 PER 100 WBC
PDW BLD-RTO: 12.4 % (ref 11.8–14.4)
PLATELET # BLD: 324 K/UL (ref 138–453)
PMV BLD AUTO: 12.5 FL (ref 8.1–13.5)
RBC # BLD: 4.89 M/UL (ref 3.95–5.11)
WBC # BLD: 7.6 K/UL (ref 3.5–11.3)

## 2023-01-01 DIAGNOSIS — Z30.41 ENCOUNTER FOR SURVEILLANCE OF CONTRACEPTIVE PILLS: ICD-10-CM

## 2023-01-01 DIAGNOSIS — N92.6 IRREGULAR MENSES: ICD-10-CM

## 2023-01-04 RX ORDER — NORETHINDRONE ACETATE AND ETHINYL ESTRADIOL AND FERROUS FUMARATE 1MG-20(21)
KIT ORAL
Qty: 84 TABLET | OUTPATIENT
Start: 2023-01-04

## 2023-01-23 ENCOUNTER — TELEPHONE (OUTPATIENT)
Dept: OBGYN | Age: 23
End: 2023-01-23

## 2023-01-23 DIAGNOSIS — O92.4 DECREASED LACTATION: Primary | ICD-10-CM

## 2023-01-23 RX ORDER — METOCLOPRAMIDE 10 MG/1
10 TABLET ORAL
Qty: 90 TABLET | Refills: 0 | Status: SHIPPED | OUTPATIENT
Start: 2023-01-23

## 2023-01-23 NOTE — TELEPHONE ENCOUNTER
Spoke with patient regarding milk supply. She states she was been using the fenugreek for about 2 months, but it does not seem to be helping. Patient states she has been in contact with the lactation consultant but all of their suggestions have not helped either. Patient admits to using lactation cookies and power pumping with limited improvement. Is interested in any other medication that may help. Discussed risk vs benefits of using Reglan for a short period of time. Reinforced that use of Reglan can lead to tardive dyskinesia. Patient voiced understanding and would like to give the medication a try. Prescription for 1 month only sent to patient's pharmacy.     ----- Message from [place], [place] sent at 1/23/2023  8:15 AM EST -----  Regarding: FW: Nursing     ----- Message -----  From: Ken Hercules  Sent: 1/21/2023   7:58 PM EST  To: [id number] [facility] Clinical Staff  Subject: Nursing                                          I have been trying to increase my supply but Im really struggling. Sherlyn Goldberg been taking the fenugreek for about 2 months now and it hasnt seemed to help I was wondering if there is anything else we could do?

## 2023-05-03 ENCOUNTER — OFFICE VISIT (OUTPATIENT)
Dept: OBGYN CLINIC | Age: 23
End: 2023-05-03
Payer: COMMERCIAL

## 2023-05-03 VITALS
SYSTOLIC BLOOD PRESSURE: 116 MMHG | WEIGHT: 186 LBS | HEIGHT: 71 IN | BODY MASS INDEX: 26.04 KG/M2 | DIASTOLIC BLOOD PRESSURE: 74 MMHG

## 2023-05-03 DIAGNOSIS — Z30.09 FAMILY PLANNING COUNSELING: Primary | ICD-10-CM

## 2023-05-03 PROCEDURE — 99212 OFFICE O/P EST SF 10 MIN: CPT | Performed by: ADVANCED PRACTICE MIDWIFE

## 2023-05-03 ASSESSMENT — ENCOUNTER SYMPTOMS
RESPIRATORY NEGATIVE: 1
GASTROINTESTINAL NEGATIVE: 1

## 2023-05-03 NOTE — PROGRESS NOTES
CC:  Chief Complaint   Patient presents with    Contraception       HPI:  Radha Patel presents today requesting start of DMPA  She used this in the past with good success and desires to restart  Used IUD as well but desires DMPA  Had unprotected intercourse last week    She reports there is a personal history or family history of:    Smoking (> 15 cigs/day): No    Migraine with Aura:  No    HTN (> 160/100): No    DVT:  No    Thrombophilias:  No    Stroke (CVA): No     Ischemic heart disease:  No    Valvular heart disease (A Fib, Pul HTN, etc): No    Positive Antiphospholipid Abs:  No    Liver Disease:  No    Review of Systems   Constitutional: Negative. HENT: Negative. Respiratory: Negative. Cardiovascular: Negative. Gastrointestinal: Negative. Genitourinary: Negative. Musculoskeletal: Negative. Skin: Negative. Neurological: Negative. Psychiatric/Behavioral: Negative. GYNECOLOGICAL HISTORY:  Menarche:  teens  LMP:  Patient's last menstrual period was 04/13/2023. Sexually active:  Yes  New partner (< 3 months): No    STI history:  Yes, 2021    Previous use of contraception (if yes, what kind): Yes, DMPA and  IUD    Vitals:    05/03/23 1100   BP: 116/74   Site: Left Upper Arm   Weight: 186 lb (84.4 kg)   Height: 5' 11\" (1.803 m)      Wt Readings from Last 3 Encounters:   05/03/23 186 lb (84.4 kg)   12/06/22 166 lb (75.3 kg)   11/21/22 165 lb (74.8 kg)       ASSESSMENT/PLAN:  Family planning counseling  1. Radha Patel was seen today for her request.  She was  counseled on the various forms of contraception,  desired to proceed with DMPA  She was made aware that hormone-based contraception may increase her risk of developing a blood clot which may in turn increase both her mortality and morbidity risks. The life-threatening side effects discussed included SOB, chest pains, severe HA's and/or calf pain (ACHES).   Radha Patel was advised that if these occur, she is to

## 2023-05-03 NOTE — PROGRESS NOTES
Patient states she is here today to discuss [facility] options. Pt states last intercourse was 1 week ago . Pt would like depo.

## 2023-05-09 ENCOUNTER — NURSE ONLY (OUTPATIENT)
Dept: OBGYN CLINIC | Age: 23
End: 2023-05-09
Payer: COMMERCIAL

## 2023-05-09 VITALS — WEIGHT: 186 LBS | DIASTOLIC BLOOD PRESSURE: 72 MMHG | BODY MASS INDEX: 25.94 KG/M2 | SYSTOLIC BLOOD PRESSURE: 112 MMHG

## 2023-05-09 DIAGNOSIS — Z30.41 ENCOUNTER FOR SURVEILLANCE OF CONTRACEPTIVE PILLS: Primary | ICD-10-CM

## 2023-05-09 PROCEDURE — 96372 THER/PROPH/DIAG INJ SC/IM: CPT | Performed by: ADVANCED PRACTICE MIDWIFE

## 2023-05-09 RX ORDER — MEDROXYPROGESTERONE ACETATE 150 MG/ML
150 INJECTION, SUSPENSION INTRAMUSCULAR ONCE
Status: COMPLETED | OUTPATIENT
Start: 2023-05-09 | End: 2023-05-09

## 2023-05-09 RX ADMIN — MEDROXYPROGESTERONE ACETATE 150 MG: 150 INJECTION, SUSPENSION INTRAMUSCULAR at 10:29

## 2023-05-09 NOTE — PROGRESS NOTES
After obtaining consent, and per orders of  naida tyler CNM injection of depo given in Left deltoid by WALT Hanks.      Ndc [id number]  Lot [id number]  Exp 2/2025

## 2023-08-01 ENCOUNTER — NURSE ONLY (OUTPATIENT)
Dept: OBGYN CLINIC | Age: 23
End: 2023-08-01
Payer: COMMERCIAL

## 2023-08-01 VITALS
DIASTOLIC BLOOD PRESSURE: 70 MMHG | BODY MASS INDEX: 27.16 KG/M2 | HEIGHT: 71 IN | WEIGHT: 194 LBS | SYSTOLIC BLOOD PRESSURE: 118 MMHG

## 2023-08-01 DIAGNOSIS — Z30.42 SURVEILLANCE OF CONTRACEPTIVE INJECTION: Primary | ICD-10-CM

## 2023-08-01 PROCEDURE — 96372 THER/PROPH/DIAG INJ SC/IM: CPT

## 2023-08-01 RX ORDER — MEDROXYPROGESTERONE ACETATE 150 MG/ML
150 INJECTION, SUSPENSION INTRAMUSCULAR ONCE
Status: COMPLETED | OUTPATIENT
Start: 2023-08-01 | End: 2023-08-01

## 2023-08-01 RX ADMIN — MEDROXYPROGESTERONE ACETATE 150 MG: 150 INJECTION, SUSPENSION INTRAMUSCULAR at 13:05

## 2023-08-01 NOTE — PROGRESS NOTES
Date of last pap: 2021  Last Depo-Provera injection: 5/9/23  Side Effects if any: none  Weight loss or gain: 8lb gain since last injection, patient is not concerned  Serum HCG indicated: n/a  Depo-Provera 150 mg IM given by: SARAH Hernandez LPN in [facility]. Glut w/out difficulty  Next appointment due in 12wks for depo injection, annual not due till 12/2023 per last PP note.

## 2023-09-14 NOTE — PROGRESS NOTES
CHIEF COMPLAINT:     Chief Complaint   Patient presents with    postpartum depression    Exposure to STD       HPI:   Kaykay Weber presents today reporting that she may have PP depression. She is teary through interview  She reports her mother has mentioned she has concerns for this. Kaykay Weber states she is unsure but perhaps she does as feels it runs in the family. She is unsure as well as she reports stress in relationship. She has noticed some symptoms; is not sure if there is anxiety as well and so is here for evaluation . No thoughts of harm   was 10/2022   Kaykay Weber reports partner has been unfaithful and desires STI testing. This is another source of stress/anxiety    GYNECOLOGIC HISTORY:     No LMP recorded. Patient has had an injection. Last pap:     Sexually active: Yes  New sexual partner: No, concerns for infidelity     STD history: No    Birth control method : DMPA      REVIEW OF SYSTEMS:  Review of Systems   Constitutional: Negative. HENT: Negative. Respiratory: Negative. Cardiovascular: Negative. Gastrointestinal: Negative. Genitourinary: Negative. Neurological: Negative. Psychiatric/Behavioral:  Positive for dysphoric mood. The patient is nervous/anxious. PHYSICAL EXAM:  Constitutional:   Blood pressure 114/82, height 5' 11\" (1.803 m), weight 190 lb (86.2 kg), not currently breastfeeding. Wt Readings from Last 3 Encounters:   09/15/23 190 lb (86.2 kg)   23 194 lb (88 kg)   23 186 lb (84.4 kg)       General Appearance: Appears healthy. Alert; in no acute physical distress but is upset  Skin: Skin color, texture, turgor normal. No rashes or lesions. Pelvic Exam: No indication  Musculoskeletal: No joint swelling, deformity, or tenderness. , no gross abnormalities. Extremities: Non-tender BLE and non-edematous.   Psych: Oriented to time, place and person,  affect is  within normal limits but she is teary        9/15/2023    12:02 PM   Postpartum

## 2023-09-15 ENCOUNTER — HOSPITAL ENCOUNTER (OUTPATIENT)
Age: 23
Setting detail: SPECIMEN
Discharge: HOME | End: 2023-09-15

## 2023-09-15 ENCOUNTER — OFFICE VISIT (OUTPATIENT)
Dept: OBGYN CLINIC | Age: 23
End: 2023-09-15
Payer: COMMERCIAL

## 2023-09-15 VITALS
SYSTOLIC BLOOD PRESSURE: 114 MMHG | HEIGHT: 71 IN | BODY MASS INDEX: 26.6 KG/M2 | DIASTOLIC BLOOD PRESSURE: 82 MMHG | WEIGHT: 190 LBS

## 2023-09-15 DIAGNOSIS — Z11.3 SCREENING FOR STD (SEXUALLY TRANSMITTED DISEASE): ICD-10-CM

## 2023-09-15 PROCEDURE — 99214 OFFICE O/P EST MOD 30 MIN: CPT | Performed by: ADVANCED PRACTICE MIDWIFE

## 2023-09-15 RX ORDER — SERTRALINE HYDROCHLORIDE 25 MG/1
25 TABLET, FILM COATED ORAL DAILY
Qty: 7 TABLET | Refills: 0 | Status: SHIPPED | OUTPATIENT
Start: 2023-09-15 | End: 2023-09-22

## 2023-09-15 ASSESSMENT — ENCOUNTER SYMPTOMS
GASTROINTESTINAL NEGATIVE: 1
RESPIRATORY NEGATIVE: 1

## 2023-09-16 DIAGNOSIS — B37.31 CANDIDA VAGINITIS: Primary | ICD-10-CM

## 2023-09-16 DIAGNOSIS — N89.8 VAGINAL DISCHARGE: ICD-10-CM

## 2023-09-16 LAB
CANDIDA SPECIES: POSITIVE
GARDNERELLA VAGINALIS: POSITIVE
SOURCE: ABNORMAL
T4 FREE SERPL-MCNC: 1.3 NG/DL (ref 0.9–1.7)
TRICHOMONAS: NEGATIVE
TSH SERPL DL<=0.05 MIU/L-ACNC: 0.93 UIU/ML (ref 0.3–5)

## 2023-09-16 RX ORDER — METRONIDAZOLE 500 MG/1
500 TABLET ORAL 2 TIMES DAILY
Qty: 14 TABLET | Refills: 0 | Status: SHIPPED | OUTPATIENT
Start: 2023-09-16 | End: 2023-09-23

## 2023-09-16 RX ORDER — FLUCONAZOLE 150 MG/1
150 TABLET ORAL ONCE
Qty: 1 TABLET | Refills: 0 | Status: SHIPPED | OUTPATIENT
Start: 2023-09-16 | End: 2023-09-16

## 2023-09-18 LAB
CHLAMYDIA DNA UR QL NAA+PROBE: NEGATIVE
N GONORRHOEA DNA UR QL NAA+PROBE: NEGATIVE
SPECIMEN DESCRIPTION: NORMAL

## 2023-10-25 ENCOUNTER — OFFICE VISIT (OUTPATIENT)
Dept: OBGYN CLINIC | Age: 23
End: 2023-10-25
Payer: COMMERCIAL

## 2023-10-25 VITALS
DIASTOLIC BLOOD PRESSURE: 80 MMHG | SYSTOLIC BLOOD PRESSURE: 114 MMHG | HEIGHT: 71 IN | BODY MASS INDEX: 26.32 KG/M2 | WEIGHT: 188 LBS

## 2023-10-25 DIAGNOSIS — Z01.419 WOMEN'S ANNUAL ROUTINE GYNECOLOGICAL EXAMINATION: Primary | ICD-10-CM

## 2023-10-25 DIAGNOSIS — Z30.42 ENCOUNTER FOR SURVEILLANCE OF INJECTABLE CONTRACEPTIVE: ICD-10-CM

## 2023-10-25 DIAGNOSIS — N92.6 IRREGULAR MENSES: ICD-10-CM

## 2023-10-25 PROCEDURE — 96372 THER/PROPH/DIAG INJ SC/IM: CPT | Performed by: ADVANCED PRACTICE MIDWIFE

## 2023-10-25 PROCEDURE — 99395 PREV VISIT EST AGE 18-39: CPT | Performed by: ADVANCED PRACTICE MIDWIFE

## 2023-10-25 RX ORDER — MEDROXYPROGESTERONE ACETATE 150 MG/ML
150 INJECTION, SUSPENSION INTRAMUSCULAR ONCE
Status: COMPLETED | OUTPATIENT
Start: 2023-10-25 | End: 2023-10-25

## 2023-10-25 RX ADMIN — MEDROXYPROGESTERONE ACETATE 150 MG: 150 INJECTION, SUSPENSION INTRAMUSCULAR at 08:07

## 2023-10-25 SDOH — ECONOMIC STABILITY: FOOD INSECURITY: WITHIN THE PAST 12 MONTHS, YOU WORRIED THAT YOUR FOOD WOULD RUN OUT BEFORE YOU GOT MONEY TO BUY MORE.: NEVER TRUE

## 2023-10-25 SDOH — ECONOMIC STABILITY: FOOD INSECURITY: WITHIN THE PAST 12 MONTHS, THE FOOD YOU BOUGHT JUST DIDN'T LAST AND YOU DIDN'T HAVE MONEY TO GET MORE.: NEVER TRUE

## 2023-10-25 ASSESSMENT — SOCIAL DETERMINANTS OF HEALTH (SDOH)
WITHIN THE LAST YEAR, HAVE YOU BEEN AFRAID OF YOUR PARTNER OR EX-PARTNER?: NO
WITHIN THE LAST YEAR, HAVE YOU BEEN KICKED, HIT, SLAPPED, OR OTHERWISE PHYSICALLY HURT BY YOUR PARTNER OR EX-PARTNER?: NO
WITHIN THE LAST YEAR, HAVE TO BEEN RAPED OR FORCED TO HAVE ANY KIND OF SEXUAL ACTIVITY BY YOUR PARTNER OR EX-PARTNER?: NO
WITHIN THE LAST YEAR, HAVE YOU BEEN HUMILIATED OR EMOTIONALLY ABUSED IN OTHER WAYS BY YOUR PARTNER OR EX-PARTNER?: NO
HOW HARD IS IT FOR YOU TO PAY FOR THE VERY BASICS LIKE FOOD, HOUSING, MEDICAL CARE, AND HEATING?: NOT HARD AT ALL

## 2023-10-25 ASSESSMENT — ANXIETY QUESTIONNAIRES
GAD7 TOTAL SCORE: 0
3. WORRYING TOO MUCH ABOUT DIFFERENT THINGS: 0
5. BEING SO RESTLESS THAT IT IS HARD TO SIT STILL: 0
6. BECOMING EASILY ANNOYED OR IRRITABLE: 0
2. NOT BEING ABLE TO STOP OR CONTROL WORRYING: 0
4. TROUBLE RELAXING: 0
7. FEELING AFRAID AS IF SOMETHING AWFUL MIGHT HAPPEN: 0
1. FEELING NERVOUS, ANXIOUS, OR ON EDGE: 0

## 2023-10-25 ASSESSMENT — PATIENT HEALTH QUESTIONNAIRE - PHQ9
SUM OF ALL RESPONSES TO PHQ9 QUESTIONS 1 & 2: 0
SUM OF ALL RESPONSES TO PHQ QUESTIONS 1-9: 0
1. LITTLE INTEREST OR PLEASURE IN DOING THINGS: 0
SUM OF ALL RESPONSES TO PHQ QUESTIONS 1-9: 0
2. FEELING DOWN, DEPRESSED OR HOPELESS: 0
SUM OF ALL RESPONSES TO PHQ QUESTIONS 1-9: 0
SUM OF ALL RESPONSES TO PHQ QUESTIONS 1-9: 0

## 2023-10-25 ASSESSMENT — ENCOUNTER SYMPTOMS
ALLERGIC/IMMUNOLOGIC NEGATIVE: 1
GASTROINTESTINAL NEGATIVE: 1
EYES NEGATIVE: 1
RESPIRATORY NEGATIVE: 1

## 2023-10-25 NOTE — PROGRESS NOTES
[address] AND GYNECOLOGY  [address] [facility] [address] Niraj Hutson [address] [place] [address]  Dept: [phone]    Patient Name: Yola Mclain  Patient Age: 20 y.o. Date of Visit: 10/25/2023    Subjective  Chief Complaint   Patient presents with    Annual Exam    Injections     depo     HPI:  Yola Mclain is a 20 y.o. female who arrives to office as an established patient for annual exam and Depo injection. Patient denies concerns today. Happy with Depo and would like to continue. Feels Zoloft is helpful, happy she is taking it. Baby is about one. Patient reports is  sexually active with 1 male partner(s). Patient has a history of sexually transmitted infection(s). CT (years ago)  Patient does not want screening for sexually transmitted infection(s). Negative screening one month ago. Review of Systems   Constitutional: Negative. Negative for chills, fatigue, fever and unexpected weight change. HENT: Negative. Eyes: Negative. Respiratory: Negative. Cardiovascular: Negative. Gastrointestinal: Negative. Endocrine: Negative. Negative for cold intolerance and heat intolerance. Genitourinary:  Negative for dyspareunia, dysuria, flank pain, frequency, menstrual problem (irregular with Depo), pelvic pain, vaginal discharge and vaginal pain. Musculoskeletal: Negative. Skin: Negative. Allergic/Immunologic: Negative. Neurological: Negative. Hematological: Negative. Psychiatric/Behavioral: Negative.          Preventive Health Screening:   Date of last pap: normal 3/22/22  History of abnormal pap: denies          HPV typing/date: N/A   Date of last mammogram: N/A   Date of last DEXA scan: N/A   Date of last colonoscopy: N/A    History of Gestational Diabetes: No     If Yes, Glucose screening ordered: N/A    Preventive screening: No     Family history of Breast, Ovarian, Colon or Uterine Cancer:  negative

## 2023-10-25 NOTE — PROGRESS NOTES
Patient is here for her annual exam  Last pap was 3/22/22    Patient would like a refill of her depo    Chaperone for Intimate Exam  Chaperone was offered as part of the rooming process. Patient declined and agrees to continue with exam without a chaperone. Chaperone: n/a       GAD7-0  PHQ2- 0    After obtaining consent, and per orders of  Flakita Bedoya CNM injection of Depo Provera given in Left  gluteus by Sada Chen MA.  Patient instructed to remain in clinic for 20 minutes afterwards, and to report any adverse reaction to me immediately    [id number]  [facility]-[id number]  EXP -3/2025

## 2023-11-13 NOTE — TELEPHONE ENCOUNTER
Please call her and let her know that she has  refills  They should be dispensing 90 pills with 3 refills given per Flakita  Have her check with the pharmacy

## 2023-12-06 RX ORDER — SERTRALINE HYDROCHLORIDE 100 MG/1
100 TABLET, FILM COATED ORAL DAILY
Qty: 30 TABLET | Refills: 3 | Status: SHIPPED | OUTPATIENT
Start: 2023-12-06

## 2024-01-24 ENCOUNTER — NURSE ONLY (OUTPATIENT)
Dept: OBGYN CLINIC | Age: 24
End: 2024-01-24
Payer: COMMERCIAL

## 2024-01-24 VITALS
BODY MASS INDEX: 25.9 KG/M2 | HEIGHT: 71 IN | DIASTOLIC BLOOD PRESSURE: 76 MMHG | SYSTOLIC BLOOD PRESSURE: 118 MMHG | WEIGHT: 185 LBS

## 2024-01-24 DIAGNOSIS — Z30.42 ENCOUNTER FOR SURVEILLANCE OF INJECTABLE CONTRACEPTIVE: Primary | ICD-10-CM

## 2024-01-24 PROCEDURE — 96372 THER/PROPH/DIAG INJ SC/IM: CPT

## 2024-01-24 RX ORDER — MEDROXYPROGESTERONE ACETATE 150 MG/ML
150 INJECTION, SUSPENSION INTRAMUSCULAR ONCE
Status: COMPLETED | OUTPATIENT
Start: 2024-01-24 | End: 2024-01-24

## 2024-01-24 RX ADMIN — MEDROXYPROGESTERONE ACETATE 150 MG: 150 INJECTION, SUSPENSION INTRAMUSCULAR at 13:32

## 2024-01-24 NOTE — PROGRESS NOTES
[facility] OBSTETRICS AND GYNECOLOGY  [address]   [address]  [facility] [address]  Dept: [phone]   Patient Name: Altagracia Healy  Patient : 2000  MRN #: [id number]  [facility] #: [id number]     CNM Depo-provera Nurse Visit    Visit date: 2024     /76   Ht 1.803 m (5' 11\")   Wt 83.9 kg (185 lb)   BMI 25.80 kg/m²        well tolerated      No LMP recorded. Patient has had an injection.      Last annual visit: 10/25/2023     Last depo-provera injection: 10/25/23    Is the patient experiencing any side effects: No         After obtaining consent, and per orders of  Anca Guerra CNM injection of depo provera given in Right upper quad. gluteus by Gretchen Dsouza MA. Patient instructed to remain in clinic for 20 minutes afterwards, and to report any adverse reaction to me immediately     Lot # [id number]   Exp date: 2025    Patient was notified:    Next Depo-provera due: 12 weeks       Next annual due: 10/26/24    Completed chart forwarded to JOYA De La Rosa after completing visit.     Charted by Gretchen Dsouza MA

## 2024-04-26 ENCOUNTER — NURSE ONLY (OUTPATIENT)
Dept: OBGYN CLINIC | Age: 24
End: 2024-04-26

## 2024-04-26 VITALS — WEIGHT: 184 LBS | BODY MASS INDEX: 25.76 KG/M2 | HEIGHT: 71 IN

## 2024-04-26 DIAGNOSIS — Z30.42 ENCOUNTER FOR SURVEILLANCE OF INJECTABLE CONTRACEPTIVE: Primary | ICD-10-CM

## 2024-04-26 RX ORDER — MEDROXYPROGESTERONE ACETATE 150 MG/ML
150 INJECTION, SUSPENSION INTRAMUSCULAR ONCE
Status: COMPLETED | OUTPATIENT
Start: 2024-04-26 | End: 2024-04-26

## 2024-04-26 RX ORDER — MEDROXYPROGESTERONE ACETATE 150 MG/ML
150 INJECTION, SUSPENSION INTRAMUSCULAR
COMMUNITY

## 2024-04-26 RX ADMIN — MEDROXYPROGESTERONE ACETATE 150 MG: 150 INJECTION, SUSPENSION INTRAMUSCULAR at 09:28

## 2024-04-26 NOTE — PROGRESS NOTES
[facility] OBSTETRICS AND GYNECOLOGY  [address]   [address]  [facility] [address]  Dept: [phone]   Patient Name: Altagracia Healy  Patient : 2000  MRN #: [id number]  CSN #: [id number]    SV CNM Depo-provera Nurse Visit    Visit date: 2024     There were no vitals taken for this visit.       well tolerated      No LMP recorded. Patient has had an injection.      Last annual visit: 10/25/23     Last depo-provera injection: 23    Is the patient experiencing any side effects: No          After obtaining consent, and per orders of  Sariah Estes CNM injection of depo given in Left upper quad. gluteus by Irena Poole MA. Patient instructed to remain in clinic for 20 minutes afterwards, and to report any adverse reaction to me immediately     Lot # [id number]   Exp date: 2025    Patient was notified:    Next Depo-provera due: 12 weeks      Next annual due: 10/2024    Completed chart forwarded to Sariah Estes after completing visit.     Charted by Irena Poole MA

## 2024-06-28 NOTE — TELEPHONE ENCOUNTER
Altagracia Healy is requesting a refill on 6/28/24.     Last Annual visit:  10/25/23  Next Office visit:  7/16/24      Last prescribing provider:  Sariah HINSON

## 2024-07-01 RX ORDER — SERTRALINE HYDROCHLORIDE 100 MG/1
100 TABLET, FILM COATED ORAL DAILY
Qty: 30 TABLET | Refills: 3 | Status: SHIPPED | OUTPATIENT
Start: 2024-07-01

## 2024-07-24 ENCOUNTER — NURSE ONLY (OUTPATIENT)
Dept: OBGYN CLINIC | Age: 24
End: 2024-07-24
Payer: COMMERCIAL

## 2024-07-24 ENCOUNTER — HOSPITAL ENCOUNTER (OUTPATIENT)
Age: 24
Setting detail: SPECIMEN
Discharge: HOME | End: 2024-07-24

## 2024-07-24 VITALS — BODY MASS INDEX: 26.88 KG/M2 | WEIGHT: 192 LBS | HEIGHT: 71 IN

## 2024-07-24 DIAGNOSIS — Z30.42 SURVEILLANCE OF CONTRACEPTIVE INJECTION: Primary | ICD-10-CM

## 2024-07-24 DIAGNOSIS — N89.8 VAGINAL DISCHARGE: ICD-10-CM

## 2024-07-24 PROCEDURE — 96372 THER/PROPH/DIAG INJ SC/IM: CPT

## 2024-07-24 RX ORDER — MEDROXYPROGESTERONE ACETATE 150 MG/ML
150 INJECTION, SUSPENSION INTRAMUSCULAR ONCE
Status: COMPLETED | OUTPATIENT
Start: 2024-07-24 | End: 2024-07-24

## 2024-07-24 RX ADMIN — MEDROXYPROGESTERONE ACETATE 150 MG: 150 INJECTION, SUSPENSION INTRAMUSCULAR at 11:16

## 2024-07-24 NOTE — PROGRESS NOTES
Date of last pap: 10/25/23  Last Depo-Provera injection: 4/26/24  Side Effects if any: none  Weight loss or gain: 8lb gain  Serum HCG indicated: N/A  Depo-Provera 150 mg IM given by: Delon in Rt. upper glut w/o difficulty  Next appointment due in October for annual exam and next depo injection   Patient c/o vaginal discharge and irritation and requested to do self swab vnap and instructions given.

## 2024-07-25 LAB
CANDIDA SPECIES: NEGATIVE
GARDNERELLA VAGINALIS: POSITIVE
SOURCE: ABNORMAL
TRICHOMONAS: NEGATIVE

## 2024-07-28 DIAGNOSIS — N76.0 BV (BACTERIAL VAGINOSIS): Primary | ICD-10-CM

## 2024-07-28 DIAGNOSIS — B96.89 BV (BACTERIAL VAGINOSIS): Primary | ICD-10-CM

## 2024-07-28 RX ORDER — METRONIDAZOLE 7.5 MG/G
1 GEL VAGINAL DAILY
Qty: 5 G | Refills: 0 | Status: SHIPPED | OUTPATIENT
Start: 2024-07-28 | End: 2024-08-02